# Patient Record
Sex: MALE | Race: WHITE | Employment: FULL TIME | URBAN - METROPOLITAN AREA
[De-identification: names, ages, dates, MRNs, and addresses within clinical notes are randomized per-mention and may not be internally consistent; named-entity substitution may affect disease eponyms.]

---

## 2019-10-18 ENCOUNTER — TELEPHONE (OUTPATIENT)
Dept: ENDOCRINOLOGY | Facility: CLINIC | Age: 24
End: 2019-10-18

## 2019-10-18 NOTE — TELEPHONE ENCOUNTER
Patient diagnosed in January 2019 and has been managed through [facility] (Records available through [facility] Everywhere).    OK to schedule next available.    Lupe Cruz LPN  Diabetes Clinic Coordinator   Adult Endocrinology and Pediatric Specialty Clinics  [facility]

## 2019-10-18 NOTE — TELEPHONE ENCOUNTER
[facility] Call Center    Phone Message    May a detailed message be left on voicemail: no    Reason for Call: Other: Pt is recently diagnosed diabetes through [facility]. He will have records faxed to [facility].  Per protocols please call pt for soonest appt.      Action Taken: Message routed to:  Adult Clinics: Endocrinology p [phone]

## 2019-10-18 NOTE — TELEPHONE ENCOUNTER
Attempted to call the patient to schedule. No answer, left voicemail for the patient to call back and schedule at the next available opening.     Jane Martin [facility]  Adult Endocrinology  [facility]

## 2019-10-21 NOTE — TELEPHONE ENCOUNTER
Contacted pt and he has already scheduled for December with Dr Montemayor.  Alisson Jeffery, CMA

## 2019-12-09 ENCOUNTER — OFFICE VISIT (OUTPATIENT)
Dept: ENDOCRINOLOGY | Facility: CLINIC | Age: 24
End: 2019-12-09
Payer: COMMERCIAL

## 2019-12-09 VITALS — WEIGHT: 241.4 LBS | DIASTOLIC BLOOD PRESSURE: 91 MMHG | HEART RATE: 101 BPM | SYSTOLIC BLOOD PRESSURE: 127 MMHG

## 2019-12-09 DIAGNOSIS — E10.9 TYPE 1 DIABETES MELLITUS WITHOUT COMPLICATION (H): Primary | ICD-10-CM

## 2019-12-09 DIAGNOSIS — E78.5 HYPERLIPIDEMIA LDL GOAL <100: ICD-10-CM

## 2019-12-09 PROCEDURE — 99204 OFFICE O/P NEW MOD 45 MIN: CPT | Performed by: INTERNAL MEDICINE

## 2019-12-09 RX ORDER — INSULIN LISPRO 100 [IU]/ML
15 INJECTION, SOLUTION INTRAVENOUS; SUBCUTANEOUS
COMMUNITY
Start: 2019-10-23 | End: 2020-04-28

## 2019-12-09 RX ORDER — MULTIVITAMIN,THER AND MINERALS
1 TABLET ORAL DAILY
COMMUNITY

## 2019-12-09 RX ORDER — FLASH GLUCOSE SCANNING READER
EACH MISCELLANEOUS
COMMUNITY
Start: 2019-02-22 | End: 2021-04-10

## 2019-12-09 RX ORDER — CETIRIZINE HYDROCHLORIDE 10 MG/1
10 TABLET ORAL DAILY PRN
COMMUNITY

## 2019-12-09 RX ORDER — INSULIN GLARGINE 100 [IU]/ML
30 INJECTION, SOLUTION SUBCUTANEOUS DAILY
Refills: 5 | COMMUNITY
Start: 2019-10-18 | End: 2020-04-28

## 2019-12-09 RX ORDER — ATORVASTATIN CALCIUM 20 MG/1
20 TABLET, FILM COATED ORAL DAILY
COMMUNITY
Start: 2019-10-23 | End: 2020-03-10

## 2019-12-09 NOTE — PROGRESS NOTES
Endocrinology Clinic Visit    Chief Complaint: New Patient (Diabetes)     Information obtained from:Patient    Subjective:         HPI: Owen oSlomon is a 24 year old male with history of type 1 diabetes who is seen in consultation at Liberty King's request for the same.    He was diagnosed with type 1 diabetes after presenting with DKA in December 2018.  At that time he has had also 40-50 pounds weight loss.  He was initiated on insulin therapy since then.        Presenting A1c was 12.7 as documented above.  Later on A1c improved to 7.4 in October 2019.  He has a brother with type 1 diabetes as well.  He has gained back all the weight loss he has had prior to his diagnosis.    Current antidiabetic therapies include   Basaglar insulin 30 units daily  Carbohydrate coverage 1 unit for every 7 g of carbohydrate with meals  Correction scale-random did not have any correction scale provided to him.    We downloaded his glo continuous glucose monitor today.  Reviewed blood sugar readings between November 26, 2019-December 9, 2019    Average blood sugar was 200.  He was within the target range of  about 42% of the time  He has had blood sugars below 70 about 2% of the time    December 1-257, 278  December 2 211, 58, 59, 180, 302, 221  December 3-222, 141, 170, 113, 50, 74  December 4-310, 127, 93, 64, 117, 142, 154, 227, 40, 57  December 5-319, 146, 198  December 6-248, 136, 201, 206    The lowest blood sugar noted was 50.  Blood sugars usually happen following correction scale.  Hypoglycemia symptoms when his blood sugars in the 70s are dizziness, sweatiness, and feeling tired.    Currently he does not miss his insulin therapy other and may be missing breakfast dose with blood.  He eats 3 meals per day.  He works in [employer] field and his schedule varies from day-to-day.  Typically he wakes up between 4:30 AM-8:30 AM in the morning.  Lunch is afternoon.  Dinner is between 5-6 PM in the evening.  He does not  snack after dinner most of the time.  He has gained back of his weight he has lost prior to his diagnosis of type 1 diabetes.    Chronic complications of diabetes  Eye examination up-to-date-no diabetic retinopathy  No microalbuminuria  Blood pressure showed slightly increased diastolic blood pressure at 121/91  No peripheral neuropathy  Non-smoker  Is currently on Lipitor which was started by his previous endocrinologist for LDL of 186 in January 2019.  Does not report any side effects from this medication.  He has a grandfather with a history of heart disease.     No Known Allergies    Current Outpatient Medications   Medication Sig Dispense Refill     atorvastatin (LIPITOR) 20 MG tablet Take 20 mg by mouth daily       cetirizine (ZYRTEC) 10 MG tablet Take 10 mg by mouth daily       Continuous Blood Gluc  (FREESTYLE PAUL 14 DAY READER) ADELAIDE 5 times daily       insulin lispro (HUMALOG KWIKPEN) 100 UNIT/ML (1 unit dial) pen Inject 15 Units Subcutaneous       LANTUS SOLOSTAR 100 UNIT/ML soln Inject 30 Units Subcutaneous daily  5     Multiple Vitamins-Minerals (SUPER THERA CHANO M) TABS Take 1 tablet by mouth daily         Review of Systems     11 point review system (Constitutional, HENT, Eyes, Respiratory, Cardiovascular, Gastrointestinal, Genitourinary, Musculoskeletal,Neurological, Psychiatric/Behavioural, Endocrine) is negative or is as per HPI above    Past Medical History:   Diagnosis Date     Type 1 diabetes (H)        History reviewed. No pertinent surgical history.    Family History   Problem Relation Age of Onset     Thyroid Disease Mother      Diabetes Type 1 Brother      Heart Disease Maternal Grandfather        Social History     Socioeconomic History     Marital status: Single     Spouse name: None     Number of children: None     Years of education: None     Highest education level: None   Occupational History     None   Social Needs     Financial resource strain: None     Food insecurity:      Worry: None     Inability: None     Transportation needs:     Medical: None     Non-medical: None   Tobacco Use     Smoking status: Current Some Day Smoker     Types: Cigars     Smokeless tobacco: Never Used   Substance and Sexual Activity     Alcohol use: None     Drug use: None     Sexual activity: None   Lifestyle     Physical activity:     Days per week: None     Minutes per session: None     Stress: None   Relationships     Social connections:     Talks on phone: None     Gets together: None     Attends Nondenominational service: None     Active member of club or organization: None     Attends meetings of clubs or organizations: None     Relationship status: None     Intimate partner violence:     Fear of current or ex partner: None     Emotionally abused: None     Physically abused: None     Forced sexual activity: None   Other Topics Concern     None   Social History Narrative     None       Objective:   BP (!) 127/91   Pulse 101   Wt 109.5 kg (241 lb 6.5 oz)   Constitutional: Pleasant no acute cardiopulmonary distress.   EYES: anicteric, normal extra-ocular movements, no lid lag or retraction, is equal and reactive to light bilaterally.  HEENT: Mouth/Throat: Mucous membrane is moist. Oropharynx is clear.  Thyroid examination:   Cardiovascular: RRR, S1, S2 normal.   Pulmonary/Chest: CTAB. No wheezing or rales.   Abdominal: +BS. Non tender to palpation.  Stretch marks:   Neurological: Alert and oriented.  No tremor and reflexes are symmetrical bilaterally and within the normal limits. Muscle strength 5/5.   Extremities: No edema.  Skin: normal texture, color.  Lymphatic: no cervical lymphadenopathy.  Psychological: appropriate mood and affect     In House Labs:                    Assessment/Treatment Plan:      Type 1 diabetes recently diagnosed December 2018 after presenting with DKA and 50 pound weight loss:    Current antidiabetic therapies include   Basaglar insulin 30 units daily  Carbohydrate coverage 1 unit  for every 7 g of carbohydrate with meals  Correction scale-random     Based on a fasting blood sugar which is consistently high and low blood sugars noted following correction scale following adjustment was made on his insulin regimen.  Increase Basaglar insulin to 35 units daily from current dose of 30 units daily.  We will continue the same dose of carbohydrate coverage 1 unit for every 7 g of carbohydrates.  Will use for now correctional scale 1 unit for every 35 mg/dL above 40.  The following written instructions provided to the patient.  I have advised him to contact us if blood sugars persistently below 70.  Otherwise will follow-up with us in 4 weeks with CDE.  Referral arranged.    Chronic complications of diabetes  Eye examination up-to-date-no diabetic retinopathy  No microalbuminuria  Blood pressure showed slightly increased diastolic blood pressure at 121/91  No peripheral neuropathy  Non-smoker  Is currently on Lipitor which was started by his previous endocrinologist for LDL of 186 in January 2019.  Does not report any side effects from this medication.  He has a grandfather with a history of heart disease.  We are ordering blood work for thyroid, C-peptide and whitney antibody today.  Prior blood work which were done at an outside facility including basic metabolic panel, LFTs, and cholesterol panels were reviewed in detail.    Patient Instructions   Increase Lantus dose to 35 units daily.     Continue with mealtime insulin (HUMOLOG)  of 1 unit for every 7 grams of carbohydrates with meals and snacks.     Please follow this correction scale with meals three times per day and at bedtime USING HUMOLOG insulin.   For  to 174 give 1 units.  For  to 209 give 2 units.  For  to 244 give 3 units.  For  to 279 give 4 units.  For  to 314 give 5 units.  For  to 359 give 6 units.  For BG >/= to 360 give 7 units.    [facility]-Department of  Endocrinology  Clarissa Francis RN, Diabetes Educator: [phone]  Lupe Cruz LPN Diabetes Clinic Coordinator: [phone]  Clinic Line:[phone]  Clinic Fax: [phone]  On-Call Endocrine Provider at [facility] (after hours/weekends): [phone] option 4  Scheduling Line: [phone]    Appointment Reminders:  * Please bring meter and/or CGM device with for staff to download  * If you are due ONLY for an A1C, it is scheduled with the nurse and will be done in clinic. You do not need to schedule a lab appointment. Fasting is not required for an A1C.  * Refill request should be submitted to your pharmacy. They will contact clinic for approval.            I will contact the patient with the test results.  Return to clinic in 1 month with CDE..    Test and/or medications prescribed today:  Orders Placed This Encounter   Procedures     C-peptide     Glutamic acid decarboxylase (MARIBELL) antibody     TSH with free T4 reflex     AMBULATORY ADULT DIABETES EDUCATOR REFERRAL         Dipak Montemayor MD  Staff Endocrinologist    [phone]  Division of Endocrinology and Diabetes

## 2019-12-09 NOTE — LETTER
12/9/2019         RE: Owen Solomon  [id number] [address] [place] [id number]        Dear Colleague,    Thank you for referring your patient, Owen Solomon, to the [facility]. Please see a copy of my visit note below.    Endocrinology Clinic Visit    Chief Complaint: New Patient (Diabetes)     Information obtained from:Patient    Subjective:         HPI: Owen Solomon is a 24 year old male with history of type 1 diabetes who is seen in consultation at April  Fernando's request for the same.    He was diagnosed with type 1 diabetes after presenting with DKA in December 2018.  At that time he has had also 40-50 pounds weight loss.  He was initiated on insulin therapy since then.        Presenting A1c was 12.7 as documented above.  Later on A1c improved to 7.4 in October 2019.  He has a brother with type 1 diabetes as well.  He has gained back all the weight loss he has had prior to his diagnosis.    Current antidiabetic therapies include   Basaglar insulin 30 units daily  Carbohydrate coverage 1 unit for every 7 g of carbohydrate with meals  Correction scale-[facility] did not have any correction scale provided to him.    We downloaded his glo continuous glucose monitor today.  Reviewed blood sugar readings between November 26, 2019-December 9, 2019    Average blood sugar was 200.  He was within the target range of  about 42% of the time  He has had blood sugars below 70 about 2% of the time    December 1-257, 278  December 2 211, 58, 59, 180, 302, 221  December 3-222, 141, 170, 113, 50, 74  December 4-310, 127, 93, 64, 117, 142, 154, 227, 40, 57  December 5-319, 146, 198  December 6-248, 136, 201, 206    The lowest blood sugar noted was 50.  Blood sugars usually happen following correction scale.  Hypoglycemia symptoms when his blood sugars in the 70s are dizziness, sweatiness, and feeling tired.    Currently he does not miss his insulin therapy other and may be missing  breakfast dose with blood.  He eats 3 meals per day.  He works in [employer] field and his schedule varies from day-to-day.  Typically he wakes up between 4:30 AM-8:30 AM in the morning.  Lunch is afternoon.  Dinner is between 5-6 PM in the evening.  He does not snack after dinner most of the time.  He has gained back of his weight he has lost prior to his diagnosis of type 1 diabetes.    Chronic complications of diabetes  Eye examination up-to-date-no diabetic retinopathy  No microalbuminuria  Blood pressure showed slightly increased diastolic blood pressure at 121/91  No peripheral neuropathy  Non-smoker  Is currently on Lipitor which was started by his previous endocrinologist for LDL of 186 in January 2019.  Does not report any side effects from this medication.  He has a grandfather with a history of heart disease.     No Known Allergies    Current Outpatient Medications   Medication Sig Dispense Refill     atorvastatin (LIPITOR) 20 MG tablet Take 20 mg by mouth daily       cetirizine (ZYRTEC) 10 MG tablet Take 10 mg by mouth daily       Continuous Blood Gluc  (AsicAheadYLE PAUL 14 DAY READER) ADELAIDE 5 times daily       insulin lispro (HUMALOG KWIKPEN) 100 UNIT/ML (1 unit dial) pen Inject 15 Units Subcutaneous       LANTUS SOLOSTAR 100 UNIT/ML soln Inject 30 Units Subcutaneous daily  5     Multiple Vitamins-Minerals (SUPER THERA CHANO M) TABS Take 1 tablet by mouth daily         Review of Systems     11 point review system (Constitutional, HENT, Eyes, Respiratory, Cardiovascular, Gastrointestinal, Genitourinary, Musculoskeletal,Neurological, Psychiatric/Behavioural, Endocrine) is negative or is as per HPI above    Past Medical History:   Diagnosis Date     Type 1 diabetes (H)        History reviewed. No pertinent surgical history.    Family History   Problem Relation Age of Onset     Thyroid Disease Mother      Diabetes Type 1 Brother      Heart Disease Maternal Grandfather        Social History     Socioeconomic  History     Marital status: Single     Spouse name: None     Number of children: None     Years of education: None     Highest education level: None   Occupational History     None   Social Needs     Financial resource strain: None     Food insecurity:     Worry: None     Inability: None     Transportation needs:     Medical: None     Non-medical: None   Tobacco Use     Smoking status: Current Some Day Smoker     Types: Cigars     Smokeless tobacco: Never Used   Substance and Sexual Activity     Alcohol use: None     Drug use: None     Sexual activity: None   Lifestyle     Physical activity:     Days per week: None     Minutes per session: None     Stress: None   Relationships     Social connections:     Talks on phone: None     Gets together: None     Attends Congregation service: None     Active member of club or organization: None     Attends meetings of clubs or organizations: None     Relationship status: None     Intimate partner violence:     Fear of current or ex partner: None     Emotionally abused: None     Physically abused: None     Forced sexual activity: None   Other Topics Concern     None   Social History Narrative     None       Objective:   BP (!) 127/91   Pulse 101   Wt 109.5 kg (241 lb 6.5 oz)   Constitutional: Pleasant no acute cardiopulmonary distress.   EYES: anicteric, normal extra-ocular movements, no lid lag or retraction, is equal and reactive to light bilaterally.  HEENT: Mouth/Throat: Mucous membrane is moist. Oropharynx is clear.  Thyroid examination:   Cardiovascular: RRR, S1, S2 normal.   Pulmonary/Chest: CTAB. No wheezing or rales.   Abdominal: +BS. Non tender to palpation.  Stretch marks:   Neurological: Alert and oriented.  No tremor and reflexes are symmetrical bilaterally and within the normal limits. Muscle strength 5/5.   Extremities: No edema.  Skin: normal texture, color.  Lymphatic: no cervical lymphadenopathy.  Psychological: appropriate mood and affect     In House Labs:                     Assessment/Treatment Plan:      Type 1 diabetes recently diagnosed December 2018 after presenting with DKA and 50 pound weight loss:    Current antidiabetic therapies include   Basaglar insulin 30 units daily  Carbohydrate coverage 1 unit for every 7 g of carbohydrate with meals  Correction scale-random     Based on a fasting blood sugar which is consistently high and low blood sugars noted following correction scale following adjustment was made on his insulin regimen.  Increase Basaglar insulin to 35 units daily from current dose of 30 units daily.  We will continue the same dose of carbohydrate coverage 1 unit for every 7 g of carbohydrates.  Will use for now correctional scale 1 unit for every 35 mg/dL above 40.  The following written instructions provided to the patient.  I have advised him to contact us if blood sugars persistently below 70.  Otherwise will follow-up with us in 4 weeks with CDE.  Referral arranged.    Chronic complications of diabetes  Eye examination up-to-date-no diabetic retinopathy  No microalbuminuria  Blood pressure showed slightly increased diastolic blood pressure at 121/91  No peripheral neuropathy  Non-smoker  Is currently on Lipitor which was started by his previous endocrinologist for LDL of 186 in January 2019.  Does not report any side effects from this medication.  He has a grandfather with a history of heart disease.  We are ordering blood work for thyroid, C-peptide and whitney antibody today.  Prior blood work which were done at an outside facility including basic metabolic panel, LFTs, and cholesterol panels were reviewed in detail.    Patient Instructions   Increase Lantus dose to 35 units daily.     Continue with mealtime insulin (HUMOLOG)  of 1 unit for every 7 grams of carbohydrates with meals and snacks.     Please follow this correction scale with meals three times per day and at bedtime USING HUMOLOG insulin.   For  to 174 give 1 units.  For BG  175 to 209 give 2 units.  For  to 244 give 3 units.  For  to 279 give 4 units.  For  to 314 give 5 units.  For  to 359 give 6 units.  For BG >/= to 360 give 7 units.    [facility]  Clarissa Francis RN, Diabetes Educator: [phone]  Lupe Cruz LPN Diabetes Clinic Coordinator: [phone]  Clinic Line:[phone]  Clinic Fax: [phone]  On-Call Endocrine Provider at [facility] (after hours/weekends): [phone] option 4  Scheduling Line: [phone]    Appointment Reminders:  * Please bring meter and/or CGM device with for staff to download  * If you are due ONLY for an A1C, it is scheduled with the nurse and will be done in clinic. You do not need to schedule a lab appointment. Fasting is not required for an A1C.  * Refill request should be submitted to your pharmacy. They will contact clinic for approval.            I will contact the patient with the test results.  Return to clinic in 1 month with CDE..    Test and/or medications prescribed today:  Orders Placed This Encounter   Procedures     C-peptide     Glutamic acid decarboxylase (MARIBELL) antibody     TSH with free T4 reflex     AMBULATORY ADULT DIABETES EDUCATOR REFERRAL         Dipak Montemayor MD  Staff Endocrinologist    [phone]  Division of Endocrinology and Diabetes            Again, thank you for allowing me to participate in the care of your patient.        Sincerely,        Dipak Montemayor MD

## 2019-12-09 NOTE — PATIENT INSTRUCTIONS
Increase Lantus dose to 35 units daily.     Continue with mealtime insulin (HUMOLOG)  of 1 unit for every 7 grams of carbohydrates with meals and snacks.     Please follow this correction scale with meals three times per day and at bedtime USING HUMOLOG insulin.   For  to 174 give 1 units.  For  to 209 give 2 units.  For  to 244 give 3 units.  For  to 279 give 4 units.  For  to 314 give 5 units.  For  to 359 give 6 units.  For BG >/= to 360 give 7 units.    [facility]  Clarissa Francis RN, Diabetes Educator: [phone]  Lupe Cruz LPN Diabetes Clinic Coordinator: [phone]  Clinic Line:[phone]  Clinic Fax: [phone]  On-Call Endocrine Provider at the [place] (after hours/weekends): [phone] option 4  Scheduling Line: [phone]    Appointment Reminders:  * Please bring meter and/or CGM device with for staff to download  * If you are due ONLY for an A1C, it is scheduled with the nurse and will be done in clinic. You do not need to schedule a lab appointment. Fasting is not required for an A1C.  * Refill request should be submitted to your pharmacy. They will contact clinic for approval.

## 2019-12-09 NOTE — NURSING NOTE
Owen Solomon's goals for this visit include:   Chief Complaint   Patient presents with     New Patient     Diabetes     He requests these members of his care team be copied on today's visit information:     PCP: Liberty King    Referring Provider:  Liberty King  [facility]  [address] [place] [address]    BP (!) 151/112 (BP Location: Left arm, Patient Position: Sitting, Cuff Size: Adult Regular)   Pulse 101   Wt 109.5 kg (241 lb 6.5 oz)     Do you need any medication refills at today's visit? No

## 2020-01-24 ENCOUNTER — ALLIED HEALTH/NURSE VISIT (OUTPATIENT)
Dept: EDUCATION SERVICES | Facility: CLINIC | Age: 25
End: 2020-01-24
Payer: COMMERCIAL

## 2020-01-24 DIAGNOSIS — E10.9 TYPE 1 DIABETES MELLITUS (H): Primary | ICD-10-CM

## 2020-01-24 DIAGNOSIS — E11.9 DIABETES MELLITUS WITHOUT COMPLICATION (H): Primary | ICD-10-CM

## 2020-01-24 PROCEDURE — 99207 ZZC DROP WITH A PROCEDURE: CPT

## 2020-01-24 PROCEDURE — G0108 DIAB MANAGE TRN  PER INDIV: HCPCS

## 2020-01-24 RX ORDER — URINE ACETONE TEST STRIPS
STRIP MISCELLANEOUS PRN
Qty: 25 EACH | Refills: 0 | Status: SHIPPED | OUTPATIENT
Start: 2020-01-24

## 2020-01-24 NOTE — PROGRESS NOTES
Diabetes Self Management Training: Individual Review Visit    Owen Solomon presents today for education and evaluation of glucose control related to Type 1 diabetes.    He is accompanied by self    Patient's diabetes management related comments/concerns: None at this time.     Patient's emotional response to diabetes: expresses readiness to learn and acceptance.     Patient would like this visit to be focused around the following diabetes-related behaviors and goals: N/a.    ASSESSMENT:  Patient presents to clinic to meet with Cde for bg review. Diagnosed with Type 1 Diabetes, Dec, 2018. Diabetes therapy: basal/bolus plus Carmen Cgms system. Pt met with Endo Dec, 2019. Works full time in IT. Lives with sister. Independantly cares for his diabetes.       Current Diabetes Management per Patient:  Taking diabetes medications? Yes: see below.        Diabetes Medication(s)     Diabetic Other       Glucagon (BAQSIMI ONE PACK) 3 MG/DOSE POWD    Spray 1 each in nostril as needed    Insulin       insulin lispro (HUMALOG KWIKPEN) 100 UNIT/ML (1 unit dial) pen    Inject 15 Units Subcutaneous     LANTUS SOLOSTAR 100 UNIT/ML soln    Inject 30 Units Subcutaneous daily          Do you have any difficulty affording your medications or glucose monitoring supplies?  No.     Patient glucose self monitoring as follows:Uses [facility] Cgms. Had issue with one sensor going bad so was without sensor for > 2weeks. Pt states he checked bg using meter during this time but forgot to bring meter to appt. Cde had pt call [facility] during visit to ask them to replace bad sensor. Task completed.     BG RESULTS: Per Carmen Download - only had four days of bg values recorded.   Based on download report, insulin dose changes were not made.     Nutrition:  Not discussed today.    Physical Activity:    Not discussed today.    Diabetes Risk Factors:  Family history - brother, age 30, has Type 1. Diagnosed, age 11.      Relevant co-morbidities and related  "health problems:  Significant for: None    Diabetes Complications:  Not discussed today.    Recent health service and resource utilization related to diabetes (hyperglycemia, hypoglycemia, etc):   None    Vitals:  There were no vitals taken for this visit.  There is no height or weight on file to calculate BMI.   Last 3 BP:   BP Readings from Last 3 Encounters:   12/09/19 (!) 127/91       History   Smoking Status     Never Smoker   Smokeless Tobacco     Never Used       Socio/Economic/Cultural Considerations:    Support system: not assessed    Cultural Influences/Ethnic Background:  American    Health Literacy/Numeracy:  \"With diabetes, it's helpful to use forms and log books to write down blood sugars and what you're eating at times to help understand how foods affect your blood sugars. With this in mind how confident are you at filling out medical forms, such as these, by yourself?  Not Assessed    Health Beliefs and Attitudes:   Patient Activation Measure Survey Score:  No flowsheet data found.    Stage of Change: ACTION (Actively working towards change)      Diabetes knowledge and skills assessment:     Patient is knowledgeable in diabetes management concepts related to: Monitoring and Taking Medication    Patient needs further education on the following diabetes management concepts: Healthy Eating, Being Active, Problem Solving and Reducing Risks    Barriers to Learning Assessment: No Barriers identified    Based on learning assessment above, most appropriate setting for further diabetes education would be: Individual setting.    INTERVENTION:   Education provided today on:  Diabetes Pathophysiology: discussed difference between Type 1 and type 2 Diabetes, causes and treatment.   Taking Medication: discussed difference in action between basal and bolus insulin.   Problem Solving: discussed sick day and ketone mgmt and bg pattern mgmt.    Opportunities for ongoing education and support in diabetes-self " "management were discussed.    Pt verbalized understanding of concepts discussed and recommendations provided today.       Education Materials Provided:  Pt encouraged to go online and purchase a used copy of the \"[employer] [place]\" book \"Understanding Type 1 Diabetes\".   Pt encouraged to download the Calorie Miguel Ángel and My Fitness Pal apps to help with carb counting.    PLAN:  Keep f/u appt with Endo.     Ongoing plan for education and support: As needed.     Time Spent: 75 minutes  Encounter Type: Individual    Any diabetes medication dose changes were made via the CDE Protocol and Collaborative Practice Agreement with the patient's endocrinology provider. A copy of this encounter was shared with the provider.    Owen Solomon comes into clinic today at the request of Dr Montemayor, Ordering Provider for Pt Teaching on diabetes self mgmt and bg review.     This service provided today was under the supervising provider of the ignacio Dickson, who was available if needed.    Clarissa Francis, RN, BSN, CDE   [facility]  "

## 2020-02-23 DIAGNOSIS — E10.9 TYPE 1 DIABETES MELLITUS (H): ICD-10-CM

## 2020-02-25 NOTE — TELEPHONE ENCOUNTER
Glucagon (BAQSIMI ONE PACK) 3 MG/DOSE POWD      Last Written Prescription Date:  1/24/20  Last Fill Quantity: 1 ea,   # refills: 0  Last Office Visit : 12/9/19  Future Office visit:  3/9/20    Refill to pharmacy.

## 2020-03-09 ENCOUNTER — OFFICE VISIT (OUTPATIENT)
Dept: ENDOCRINOLOGY | Facility: CLINIC | Age: 25
End: 2020-03-09
Payer: COMMERCIAL

## 2020-03-09 VITALS
SYSTOLIC BLOOD PRESSURE: 140 MMHG | HEIGHT: 73 IN | HEART RATE: 97 BPM | BODY MASS INDEX: 33.93 KG/M2 | OXYGEN SATURATION: 97 % | WEIGHT: 256 LBS | DIASTOLIC BLOOD PRESSURE: 96 MMHG

## 2020-03-09 DIAGNOSIS — E78.5 HYPERLIPIDEMIA LDL GOAL <70: ICD-10-CM

## 2020-03-09 DIAGNOSIS — E10.9 TYPE 1 DIABETES MELLITUS WITHOUT COMPLICATION (H): Primary | ICD-10-CM

## 2020-03-09 LAB
ANION GAP SERPL CALCULATED.3IONS-SCNC: 4 MMOL/L (ref 3–14)
BUN SERPL-MCNC: 18 MG/DL (ref 7–30)
C PEPTIDE SERPL-MCNC: 0.5 NG/ML (ref 0.9–6.9)
CALCIUM SERPL-MCNC: 9.4 MG/DL (ref 8.5–10.1)
CHLORIDE SERPL-SCNC: 105 MMOL/L (ref 94–109)
CHOLEST SERPL-MCNC: 246 MG/DL
CO2 SERPL-SCNC: 30 MMOL/L (ref 20–32)
CREAT SERPL-MCNC: 0.85 MG/DL (ref 0.66–1.25)
GFR SERPL CREATININE-BSD FRML MDRD: >90 ML/MIN/{1.73_M2}
GLUCOSE SERPL-MCNC: 158 MG/DL (ref 70–99)
HBA1C MFR BLD: 7.8 % (ref 0–5.6)
HDLC SERPL-MCNC: 65 MG/DL
LDLC SERPL CALC-MCNC: 157 MG/DL
NONHDLC SERPL-MCNC: 181 MG/DL
POTASSIUM SERPL-SCNC: 4.5 MMOL/L (ref 3.4–5.3)
SODIUM SERPL-SCNC: 139 MMOL/L (ref 133–144)
TRIGL SERPL-MCNC: 118 MG/DL
TSH SERPL DL<=0.005 MIU/L-ACNC: 3.36 MU/L (ref 0.4–4)
TSH SERPL DL<=0.005 MIU/L-ACNC: 3.36 MU/L (ref 0.4–4)

## 2020-03-09 PROCEDURE — 84443 ASSAY THYROID STIM HORMONE: CPT | Performed by: INTERNAL MEDICINE

## 2020-03-09 PROCEDURE — 95251 CONT GLUC MNTR ANALYSIS I&R: CPT | Performed by: INTERNAL MEDICINE

## 2020-03-09 PROCEDURE — 84681 ASSAY OF C-PEPTIDE: CPT | Performed by: INTERNAL MEDICINE

## 2020-03-09 PROCEDURE — 86341 ISLET CELL ANTIBODY: CPT | Mod: 90 | Performed by: INTERNAL MEDICINE

## 2020-03-09 PROCEDURE — 99214 OFFICE O/P EST MOD 30 MIN: CPT | Performed by: INTERNAL MEDICINE

## 2020-03-09 PROCEDURE — 99000 SPECIMEN HANDLING OFFICE-LAB: CPT | Performed by: INTERNAL MEDICINE

## 2020-03-09 PROCEDURE — 80048 BASIC METABOLIC PNL TOTAL CA: CPT | Performed by: INTERNAL MEDICINE

## 2020-03-09 PROCEDURE — 36415 COLL VENOUS BLD VENIPUNCTURE: CPT | Performed by: INTERNAL MEDICINE

## 2020-03-09 PROCEDURE — 80061 LIPID PANEL: CPT | Performed by: INTERNAL MEDICINE

## 2020-03-09 PROCEDURE — 83036 HEMOGLOBIN GLYCOSYLATED A1C: CPT | Performed by: INTERNAL MEDICINE

## 2020-03-09 ASSESSMENT — MIFFLIN-ST. JEOR: SCORE: 2196.21

## 2020-03-09 NOTE — PROGRESS NOTES
Endocrinology Clinic Visit    Chief Complaint: Diabetes (3 Mo F/U Diabetes-Carmen)     Information obtained from:Patient    Subjective:         HPI: Owen Solomon is a 24 year old male with history of type 1 diabetes who is here for routine follow up.   He was diagnosed with type 1 diabetes after presenting with DKA in December 2018.  At that time he has had also 40-50 pounds weight loss.  He was initiated on insulin therapy since then.    Hemoglobin A1C   Date Value Ref Range Status   03/09/2020 7.8 (A) 0 - 5.6 % Final       Presenting A1c was 12.7 as documented above.  Later on A1c improved to 7.4 in October 2019.  He has a brother with type 1 diabetes as well.  He has gained back all the weight loss he has had prior to his diagnosis.  His A1c today is 7.8    Current antidiabetic therapies include   Basaglar insulin 35 units daily  Carbohydrate coverage 1 unit for every 7 g of carbohydrate with meals  Correction scale-1 unit for every 35 above 140  We downloaded his carmen continuous glucose monitor today.  Reviewed blood sugar readings between February 11, 2020-March 9, 2020  Average blood sugar was 197.  He was within the target range of  about 41% of the time  He has had blood sugars below 70 about 2% of the time he has had blood sugar readings  Between 181-250 mg/dL about 38% of the time  He has had very high blood sugars above 250  19% of the time.    Overall, detailed analysis of the carmen view download indicated blood sugar has been high throughout.  He has had high fasting blood sugars ranging between between low 100s- mid 200s.  He did not have significant lows however he reports that most of the lows have been following dinner at about midnight or so.  Hypoglycemia symptoms when his blood sugars in the 70s are dizziness, sweatiness, and feeling tired.    Currently he does not miss his insulin therapy other and may be missing breakfast dose occasionally.  He eats 3 meals per day.  He works in [employer]  field and his schedule varies from day-to-day.  Typically he wakes up between 4:30 AM-8:30 AM in the morning.  Lunch is afternoon.  Dinner is between 5-6 PM in the evening.  He does not snack after dinner most of the time.  He has gained back of his weight he has lost prior to his diagnosis of type 1 diabetes.    Chronic complications of diabetes  Eye examination up-to-date-no diabetic retinopathy  No microalbuminuria last eye exam was December of thousand 19  Blood pressure showed slightly increased diastolic blood pressure at 136/91-patient would like to address this issue at his follow-up visits.  No peripheral neuropathy  Non-smoker  Is currently on Lipitor which was started by his previous endocrinologist for LDL of 186 in January 2019.  Does not report any side effects from this medication.  He has a grandfather with a history of heart disease.     No Known Allergies    Current Outpatient Medications   Medication Sig Dispense Refill     atorvastatin (LIPITOR) 20 MG tablet Take 20 mg by mouth daily       cetirizine (ZYRTEC) 10 MG tablet Take 10 mg by mouth daily       Continuous Blood Gluc  (Ludi labsYLE PAUL 14 DAY READER) ADELAIDE 5 times daily       Glucagon 3 MG/DOSE POWD Spray 1 each in nostril as needed 1 each 0     insulin lispro (HUMALOG KWIKPEN) 100 UNIT/ML (1 unit dial) pen Inject 15 Units Subcutaneous       KETOSTIX test strip by TABLE SOLN route as needed 25 each 0     LANTUS SOLOSTAR 100 UNIT/ML soln Inject 30 Units Subcutaneous daily  5     Multiple Vitamins-Minerals (SUPER THERA CHANO M) TABS Take 1 tablet by mouth daily         Review of Systems     8 point review system (Constitutional, HENT, Eyes, Respiratory, Cardiovascular, Gastrointestinal, Genitourinary, Musculoskeletal,Neurological, Psychiatric/Behavioural, Endocrine) is negative or is as per HPI above    Past Medical History:   Diagnosis Date     Type 1 diabetes (H)        History reviewed. No pertinent surgical history.    Family  "History   Problem Relation Age of Onset     Thyroid Disease Mother      Diabetes Type 1 Brother      Heart Disease Maternal Grandfather        Social History     Socioeconomic History     Marital status: Single     Spouse name: None     Number of children: None     Years of education: None     Highest education level: None   Occupational History     None   Social Needs     Financial resource strain: None     Food insecurity:     Worry: None     Inability: None     Transportation needs:     Medical: None     Non-medical: None   Tobacco Use     Smoking status: Current Some Day Smoker     Types: Cigars     Smokeless tobacco: Never Used   Substance and Sexual Activity     Alcohol use: None     Drug use: None     Sexual activity: None   Lifestyle     Physical activity:     Days per week: None     Minutes per session: None     Stress: None   Relationships     Social connections:     Talks on phone: None     Gets together: None     Attends Latter-day service: None     Active member of club or organization: None     Attends meetings of clubs or organizations: None     Relationship status: None     Intimate partner violence:     Fear of current or ex partner: None     Emotionally abused: None     Physically abused: None     Forced sexual activity: None   Other Topics Concern     None   Social History Narrative     None       Objective:   BP (!) 140/96 (BP Location: Right arm, Patient Position: Sitting, Cuff Size: Adult Large)   Pulse 97   Ht 1.856 m (6' 1.07\")   Wt 116.1 kg (256 lb)   SpO2 97%   BMI 33.71 kg/m    Constitutional: Pleasant no acute cardiopulmonary distress.   Neurological: Alert and oriented.    Psychological: appropriate mood and affect     In House Labs:                  Hemoglobin A1C   Date Value Ref Range Status   03/09/2020 7.8 (A) 0 - 5.6 % Final       Assessment/Treatment Plan:      Type 1 diabetes recently diagnosed December 2018 after presenting with DKA and 50 pound weight loss:    Detailed " review of glo indicated blood sugars high throughout.  Given his fasting blood sugar is high therefore we have adjusted his insulin dose as follows.  He has a few lows following dinnertime carbohydrate boluses which results in lows around midnight; and usually he overcorrects and then stop having highs in the morning therefore in order for results we have reduced his dinnertime carbohydrate coverage as follows as well.  Correction dose seems to be okay at this point in time we will continue the same.  I discussed adding GLP-1 agonist with the patient in order to address weight and also for added benefit of glycemic control however he is not interested to do that at this point in time additional information provided.  We will check C-peptide level in this patient and if there is any endogenous insulin production he will benefit again from medications like GLP-1 agonists.  Further plan based on results.  Basaglar insulin 35 units daily  Carbohydrate coverage 1 unit for every 7 g of carbohydrate with meals except with dinner 1 unit for every 8  Correction scale-random 1 unit for every 35 above 140    Chronic complications of diabetes    Eye examination up-to-date-no diabetic retinopathy  No microalbuminuria last eye exam was December of thousand 19  Blood pressure showed slightly increased diastolic blood pressure at 136/91-patient would like to address this issue at his follow-up visits.  No peripheral neuropathy  Non-smoker  Is currently on Lipitor which was started by his previous endocrinologist for LDL of 186 in January 2019.  Does not report any side effects from this medication.  He has a grandfather with a history of heart disease.  We are ordering blood work for thyroid, C-peptide and whitney antibody today.  Prior blood work which were done at an outside facility including basic metabolic panel, LFTs, and cholesterol panels were reviewed in detail.    Lab results from your visit today noted.  Electrolytes  within the normal limits.  Normal kidney function test.  Total cholesterol was 249 triglycerides 118 with LDL cholesterol 157 and non-HDL cholesterol 181.  C-peptide is low as expected.  TSH is within the normal limits.  Based on the cholesterol level which documented above would be reasonable to adjust the Lipitor dose to 40 mg daily from the current dose of 20 mg daily.  Patient will be informed of the results.     Hyperlipidemia:  Total cholesterol was 249 triglycerides 118 with LDL cholesterol 157 and non-HDL cholesterol 181.  C-peptide is low as expected.  TSH is within the normal limits.  Based on the cholesterol level which documented above would be reasonable to adjust the Lipitor dose to 40 mg daily from the current dose of 20 mg daily.  Patient will be informed of the results.   Atorvastatin was changed to 40 mg daily and medication sent to pharmacy.    Patient Instructions   Blood work today.     Increase Lantus dose to 40 units daily.     Continue with mealtime insulin (HUMOLOG)  of 1 unit for every 7 grams of carbohydrates with meals and snacks.; reduce dinner time carbohydrate coverage to 1 unit for every 8 grams of carbohydrates.     Please follow this correction scale with meals three times per day and at bedtime USING HUMOLOG insulin.   For  to 174 give 1 units.  For  to 209 give 2 units.  For  to 244 give 3 units.  For  to 279 give 4 units.  For  to 314 give 5 units.  For  to 359 give 6 units.  For BG >/= to 360 give 7 units.        [facility]  Clarissa Francis RN, Diabetes Educator: [phone]  Clinic Nurses HARRISON Michel; CMA's: Jane [phone]  SPENCER Andrade : [phone]  Clinic Fax: [phone]  On-Call Endocrine at the [facility] (after hours/weekends): [phone] option 4  Scheduling Line: [phone]    Appointment Reminders:  * Please bring meter with for staff to download  * If you are due ONLY for  an A1C, it is scheduled with the nurse and will be done in clinic. You do not need to schedule a lab appointment. Fasting is not required for an A1C.  * Refill request should be submitted to your pharmacy. They will contact clinic for approval.          I will contact the patient with the test results.  Return to clinic in 3 months.  Test and/or medications prescribed today:  Orders Placed This Encounter   Procedures     Hemoglobin A1c POCT     Basic metabolic panel     C-peptide     TSH     Lipid panel reflex to direct LDL Fasting         Dipak Montemayor MD  Staff Endocrinologist    [phone]  Division of Endocrinology and Diabetes

## 2020-03-09 NOTE — LETTER
3/9/2020         RE: Owen Solomon  [id number] [address] [place] [id number]        Dear Colleague,    Thank you for referring your patient, Owen Solomon, to the [facility]. Please see a copy of my visit note below.    Endocrinology Clinic Visit    Chief Complaint: Diabetes (3 Mo F/U Diabetes-[facility])     Information obtained from:Patient    Subjective:         HPI: Owen Solomon is a 24 year old male with history of type 1 diabetes who is here for routine follow up.   He was diagnosed with type 1 diabetes after presenting with DKA in December 2018.  At that time he has had also 40-50 pounds weight loss.  He was initiated on insulin therapy since then.    Hemoglobin A1C   Date Value Ref Range Status   03/09/2020 7.8 (A) 0 - 5.6 % Final       Presenting A1c was 12.7 as documented above.  Later on A1c improved to 7.4 in October 2019.  He has a brother with type 1 diabetes as well.  He has gained back all the weight loss he has had prior to his diagnosis.  His A1c today is 7.8    Current antidiabetic therapies include   Basaglar insulin 35 units daily  Carbohydrate coverage 1 unit for every 7 g of carbohydrate with meals  Correction scale-1 unit for every 35 above 140  We downloaded his carmen continuous glucose monitor today.  Reviewed blood sugar readings between February 11, 2020-March 9, 2020  Average blood sugar was 197.  He was within the target range of  about 41% of the time  He has had blood sugars below 70 about 2% of the time he has had blood sugar readings  Between 181-250 mg/dL about 38% of the time  He has had very high blood sugars above 250  19% of the time.    Overall, detailed analysis of the carmen view download indicated blood sugar has been high throughout.  He has had high fasting blood sugars ranging between between low 100s- mid 200s.  He did not have significant lows however he reports that most of the lows have been following dinner at about midnight or  so.  Hypoglycemia symptoms when his blood sugars in the 70s are dizziness, sweatiness, and feeling tired.    Currently he does not miss his insulin therapy other and may be missing breakfast dose occasionally.  He eats 3 meals per day.  He works in [employer] and his schedule varies from day-to-day.  Typically he wakes up between 4:30 AM-8:30 AM in the morning.  Lunch is afternoon.  Dinner is between 5-6 PM in the evening.  He does not snack after dinner most of the time.  He has gained back of his weight he has lost prior to his diagnosis of type 1 diabetes.    Chronic complications of diabetes  Eye examination up-to-date-no diabetic retinopathy  No microalbuminuria last eye exam was December of thousand 19  Blood pressure showed slightly increased diastolic blood pressure at 136/91-patient would like to address this issue at his follow-up visits.  No peripheral neuropathy  Non-smoker  Is currently on Lipitor which was started by his previous endocrinologist for LDL of 186 in January 2019.  Does not report any side effects from this medication.  He has a grandfather with a history of heart disease.     No Known Allergies    Current Outpatient Medications   Medication Sig Dispense Refill     atorvastatin (LIPITOR) 20 MG tablet Take 20 mg by mouth daily       cetirizine (ZYRTEC) 10 MG tablet Take 10 mg by mouth daily       Continuous Blood Gluc  (FREESTYLE PAUL 14 DAY READER) ADELAIDE 5 times daily       Glucagon 3 MG/DOSE POWD Spray 1 each in nostril as needed 1 each 0     insulin lispro (HUMALOG KWIKPEN) 100 UNIT/ML (1 unit dial) pen Inject 15 Units Subcutaneous       KETOSTIX test strip by TABLE SOLN route as needed 25 each 0     LANTUS SOLOSTAR 100 UNIT/ML soln Inject 30 Units Subcutaneous daily  5     Multiple Vitamins-Minerals (SUPER THERA CAHNO M) TABS Take 1 tablet by mouth daily         Review of Systems     8 point review system (Constitutional, HENT, Eyes, Respiratory, Cardiovascular, Gastrointestinal,  "Genitourinary, Musculoskeletal,Neurological, Psychiatric/Behavioural, Endocrine) is negative or is as per HPI above    Past Medical History:   Diagnosis Date     Type 1 diabetes (H)        History reviewed. No pertinent surgical history.    Family History   Problem Relation Age of Onset     Thyroid Disease Mother      Diabetes Type 1 Brother      Heart Disease Maternal Grandfather        Social History     Socioeconomic History     Marital status: Single     Spouse name: None     Number of children: None     Years of education: None     Highest education level: None   Occupational History     None   Social Needs     Financial resource strain: None     Food insecurity:     Worry: None     Inability: None     Transportation needs:     Medical: None     Non-medical: None   Tobacco Use     Smoking status: Current Some Day Smoker     Types: Cigars     Smokeless tobacco: Never Used   Substance and Sexual Activity     Alcohol use: None     Drug use: None     Sexual activity: None   Lifestyle     Physical activity:     Days per week: None     Minutes per session: None     Stress: None   Relationships     Social connections:     Talks on phone: None     Gets together: None     Attends Gnosticist service: None     Active member of club or organization: None     Attends meetings of clubs or organizations: None     Relationship status: None     Intimate partner violence:     Fear of current or ex partner: None     Emotionally abused: None     Physically abused: None     Forced sexual activity: None   Other Topics Concern     None   Social History Narrative     None       Objective:   BP (!) 140/96 (BP Location: Right arm, Patient Position: Sitting, Cuff Size: Adult Large)   Pulse 97   Ht 1.856 m (6' 1.07\")   Wt 116.1 kg (256 lb)   SpO2 97%   BMI 33.71 kg/m    Constitutional: Pleasant no acute cardiopulmonary distress.   Neurological: Alert and oriented.    Psychological: appropriate mood and affect     In House Labs:        "           Hemoglobin A1C   Date Value Ref Range Status   03/09/2020 7.8 (A) 0 - 5.6 % Final       Assessment/Treatment Plan:      Type 1 diabetes recently diagnosed December 2018 after presenting with DKA and 50 pound weight loss:    Detailed review of glo indicated blood sugars high throughout.  Given his fasting blood sugar is high therefore we have adjusted his insulin dose as follows.  He has a few lows following dinnertime carbohydrate boluses which results in lows around midnight; and usually he overcorrects and then stop having highs in the morning therefore in order for results we have reduced his dinnertime carbohydrate coverage as follows as well.  Correction dose seems to be okay at this point in time we will continue the same.  I discussed adding GLP-1 agonist with the patient in order to address weight and also for added benefit of glycemic control however he is not interested to do that at this point in time additional information provided.  We will check C-peptide level in this patient and if there is any endogenous insulin production he will benefit again from medications like GLP-1 agonists.  Further plan based on results.  Basaglar insulin 35 units daily  Carbohydrate coverage 1 unit for every 7 g of carbohydrate with meals except with dinner 1 unit for every 8  Correction scale-random 1 unit for every 35 above 140    Chronic complications of diabetes    Eye examination up-to-date-no diabetic retinopathy  No microalbuminuria last eye exam was December of thousand 19  Blood pressure showed slightly increased diastolic blood pressure at 136/91-patient would like to address this issue at his follow-up visits.  No peripheral neuropathy  Non-smoker  Is currently on Lipitor which was started by his previous endocrinologist for LDL of 186 in January 2019.  Does not report any side effects from this medication.  He has a grandfather with a history of heart disease.  We are ordering blood work for  thyroid, C-peptide and whitney antibody today.  Prior blood work which were done at an outside facility including basic metabolic panel, LFTs, and cholesterol panels were reviewed in detail.    Lab results from your visit today noted.  Electrolytes within the normal limits.  Normal kidney function test.  Total cholesterol was 249 triglycerides 118 with LDL cholesterol 157 and non-HDL cholesterol 181.  C-peptide is low as expected.  TSH is within the normal limits.  Based on the cholesterol level which documented above would be reasonable to adjust the Lipitor dose to 40 mg daily from the current dose of 20 mg daily.  Patient will be informed of the results.     Hyperlipidemia:  Total cholesterol was 249 triglycerides 118 with LDL cholesterol 157 and non-HDL cholesterol 181.  C-peptide is low as expected.  TSH is within the normal limits.  Based on the cholesterol level which documented above would be reasonable to adjust the Lipitor dose to 40 mg daily from the current dose of 20 mg daily.  Patient will be informed of the results.   Atorvastatin was changed to 40 mg daily and medication sent to pharmacy.    Patient Instructions   Blood work today.     Increase Lantus dose to 40 units daily.     Continue with mealtime insulin (HUMOLOG)  of 1 unit for every 7 grams of carbohydrates with meals and snacks.; reduce dinner time carbohydrate coverage to 1 unit for every 8 grams of carbohydrates.     Please follow this correction scale with meals three times per day and at bedtime USING HUMOLOG insulin.   For  to 174 give 1 units.  For  to 209 give 2 units.  For  to 244 give 3 units.  For  to 279 give 4 units.  For  to 314 give 5 units.  For  to 359 give 6 units.  For BG >/= to 360 give 7 units.        [facility]-Department of Endocrinology  Clarissa Francis RN, Diabetes Educator: [phone]  Clinic Nurses HARRISON Michel; CMA's: Jane [phone]  SPENCER Andrade :  [phone]  Clinic Fax: [phone]  On-Call Endocrine at the [place] (after hours/weekends): [phone] option 4  Scheduling Line: [phone]    Appointment Reminders:  * Please bring meter with for staff to download  * If you are due ONLY for an A1C, it is scheduled with the nurse and will be done in clinic. You do not need to schedule a lab appointment. Fasting is not required for an A1C.  * Refill request should be submitted to your pharmacy. They will contact clinic for approval.          I will contact the patient with the test results.  Return to clinic in 3 months.  Test and/or medications prescribed today:  Orders Placed This Encounter   Procedures     Hemoglobin A1c POCT     Basic metabolic panel     C-peptide     TSH     Lipid panel reflex to direct LDL Fasting         Dipak Montemayor MD  Staff Endocrinologist    [phone]  Division of Endocrinology and Diabetes          Again, thank you for allowing me to participate in the care of your patient.        Sincerely,        Dipak Montemayor MD

## 2020-03-10 LAB — GAD65 AB SER IA-ACNC: >250 IU/ML (ref 0–5)

## 2020-03-10 RX ORDER — ATORVASTATIN CALCIUM 40 MG/1
40 TABLET, FILM COATED ORAL DAILY
Qty: 90 TABLET | Refills: 1 | Status: SHIPPED | OUTPATIENT
Start: 2020-03-10 | End: 2020-09-28

## 2020-03-10 NOTE — RESULT ENCOUNTER NOTE
Owen,    Attached please find blood work results done at the time of your visit at endocrinology clinic.  1.  The C-peptide level is low as expected in type 1 diabetes.  2.  Thyroid level is within normal limits  3.  Kidney function test and electrolytes are within the normal limits  4.  Your cholesterol test results indicated that the bad cholesterol LDL is a still on the high side- higher than desired therefore I recommend increasing the atorvastatin dose from current 20 mg daily to 40 mg daily.  I have sent a prescription to your pharmacy.      Please let me know if you have any questions regarding these results.    Dipak Montemayor MD

## 2020-03-11 ENCOUNTER — HEALTH MAINTENANCE LETTER (OUTPATIENT)
Age: 25
End: 2020-03-11

## 2020-03-20 ENCOUNTER — TELEPHONE (OUTPATIENT)
Dept: ENDOCRINOLOGY | Facility: CLINIC | Age: 25
End: 2020-03-20

## 2020-03-20 NOTE — TELEPHONE ENCOUNTER
----- Message from Dipak Montemayor MD sent at 3/16/2020 10:52 AM CDT -----  [employer] message sent to pt. It doesn't look like he has seen it. If he doesn't read it in a few days; please give him a call.

## 2020-03-20 NOTE — TELEPHONE ENCOUNTER
[facility] message as follows:    Notes recorded by Dipak Montemayor MD on 3/11/2020 at 3:33 PM CDT   Magdy Weaver please find blood work results done at the time of your visit at endocrinology clinic.   1.  The C-peptide level is low as expected in type 1 diabetes.  Logan antibody is also high as expected in type 1 diabetes.   2.  Thyroid level is within normal limits   3.  Kidney function test and electrolytes are within the normal limits   4.  Your cholesterol test results indicated that the bad cholesterol LDL is a still on the high side- higher than desired therefore I recommend increasing the atorvastatin dose from current 20 mg daily to 40 mg daily.  I have sent a prescription to your pharmacy.       Please let me know if you have any questions regarding these results.     Dipak Montemayor MD           Attempted to contact patient. Left voicemail for patient to contact our office.       Marilu Decker RN  Endocrine Care Coordinator  [facility]

## 2020-04-14 NOTE — TELEPHONE ENCOUNTER
Patient has not returned call. Uncertain if patient has viewed results/ recommendations in [facility]. Again attempted to contact patient. Left voicemail for patient to contact our office.       Marilu Decker RN  Endocrine Care Coordinator  [facility]

## 2020-04-17 NOTE — TELEPHONE ENCOUNTER
Please note patient has not returned call/messages but per MyChart review, patient has viewed results and recommendations from Dr. Montemayor.      Marilu Decker RN  Endocrine Care Coordinator  [facility]

## 2020-04-28 DIAGNOSIS — E10.9 TYPE 1 DIABETES MELLITUS WITHOUT COMPLICATION (H): Primary | ICD-10-CM

## 2020-04-29 RX ORDER — INSULIN GLARGINE 100 [IU]/ML
40 INJECTION, SOLUTION SUBCUTANEOUS DAILY
Qty: 45 ML | Refills: 1 | Status: SHIPPED | OUTPATIENT
Start: 2020-04-29 | End: 2020-09-28

## 2020-04-29 RX ORDER — INSULIN LISPRO 100 [IU]/ML
INJECTION, SOLUTION INTRAVENOUS; SUBCUTANEOUS
Qty: 45 ML | Refills: 1 | Status: SHIPPED | OUTPATIENT
Start: 2020-04-29 | End: 2020-09-28

## 2020-04-29 NOTE — TELEPHONE ENCOUNTER
Reviewed Dr. Montemayor' progress note from 3/9/20. Prescriptions have not been completed yet by Dr. Montemayor. Prescriptions currently under previous MD. Updated dosing for Lantus and Humalog but need to confirm current daily dose of Humalog with patient. Left voicemail for patient to contact our office.       Marilu Decker RN  Endocrine Care Coordinator  [facility]'

## 2020-04-29 NOTE — TELEPHONE ENCOUNTER
insulin lispro (HUMALOG KWIKPEN) 100 UNIT/ML (1 unit dial) KWIKPEN     Last Written Prescription Date:  ?  Last Fill Quantity: ?   # refills: ?  Last Office Visit : 3/9/2020  Future Office visit:  6/8/2020  Routing refill request to provider for review/approval because:  Drug not on the [facility], [facility] or [facility] refill protocol or controlled substance  Medication is reported/historical    LANTUS SOLOSTAR 100 UNIT/ML soln     Last Written Prescription Date:  10/18/2019  Last Fill Quantity: ?,   # refills: 5  Last Office Visit : 3/9/2020  Future Office visit:  6/8/2020  Routing refill request to provider for review/approval because:  Drug not on the [facility], [facility] or [facility] refill protocol or controlled substance  Medication is reported/historical      Maria Elena Francois RN  Central Triage Red Flags/Med Refills

## 2020-04-29 NOTE — TELEPHONE ENCOUNTER
Patient returned call and confirms that his Humalog daily dose if roughly 30-40 units daily.       Will send prescriptions for Dr. Montemayor to sign.      Marilu Decker RN  Endocrine Care Coordinator  [facility]

## 2020-09-28 ENCOUNTER — OFFICE VISIT (OUTPATIENT)
Dept: ENDOCRINOLOGY | Facility: CLINIC | Age: 25
End: 2020-09-28
Payer: COMMERCIAL

## 2020-09-28 VITALS
BODY MASS INDEX: 35.54 KG/M2 | WEIGHT: 269.9 LBS | DIASTOLIC BLOOD PRESSURE: 92 MMHG | SYSTOLIC BLOOD PRESSURE: 133 MMHG | HEART RATE: 93 BPM | OXYGEN SATURATION: 98 %

## 2020-09-28 DIAGNOSIS — I10 ESSENTIAL HYPERTENSION: ICD-10-CM

## 2020-09-28 DIAGNOSIS — E10.9 TYPE 1 DIABETES MELLITUS WITHOUT COMPLICATION (H): Primary | ICD-10-CM

## 2020-09-28 DIAGNOSIS — E66.01 MORBID OBESITY (H): ICD-10-CM

## 2020-09-28 LAB — HBA1C MFR BLD: 8.4 % (ref 0–5.6)

## 2020-09-28 PROCEDURE — 83036 HEMOGLOBIN GLYCOSYLATED A1C: CPT | Performed by: INTERNAL MEDICINE

## 2020-09-28 PROCEDURE — 36415 COLL VENOUS BLD VENIPUNCTURE: CPT | Performed by: INTERNAL MEDICINE

## 2020-09-28 PROCEDURE — 95251 CONT GLUC MNTR ANALYSIS I&R: CPT | Performed by: INTERNAL MEDICINE

## 2020-09-28 PROCEDURE — 99214 OFFICE O/P EST MOD 30 MIN: CPT | Performed by: INTERNAL MEDICINE

## 2020-09-28 RX ORDER — INSULIN LISPRO 100 [IU]/ML
INJECTION, SOLUTION INTRAVENOUS; SUBCUTANEOUS
Qty: 60 ML | Refills: 1 | Status: SHIPPED | OUTPATIENT
Start: 2020-09-28 | End: 2021-09-03

## 2020-09-28 RX ORDER — INSULIN GLARGINE 100 [IU]/ML
40 INJECTION, SOLUTION SUBCUTANEOUS DAILY
Qty: 45 ML | Refills: 1 | Status: SHIPPED | OUTPATIENT
Start: 2020-09-28 | End: 2020-11-06

## 2020-09-28 NOTE — PROGRESS NOTES
Endocrinology Clinic Visit    Chief Complaint: Diabetes     Information obtained from:Patient    Subjective:         HPI: Owen Solomon is a 25 year old male with history of type 1 diabetes who is here for routine follow up.   He was diagnosed with type 1 diabetes after presenting with DKA in December 2018.  At that time he has had also 40-50 pounds weight loss.  He was initiated on insulin therapy since then.    Hemoglobin A1C   Date Value Ref Range Status   09/28/2020 8.4 (A) 0 - 5.6 % Final   03/09/2020 7.8 (A) 0 - 5.6 % Final                       He has hypoglycemia awareness.  He reports consistently missing lunchtime mealtime bolus.    He eats 3 meals per day.  He works in [employer] field and his schedule varies from day-to-day.  Typically he wakes up between 4:30 AM-8:30 AM in the morning.  Lunch is afternoon.  Dinner is between 5-6 PM in the evening.  He does not snack after dinner most of the time.  He has gained back of his weight he has lost prior to his diagnosis of type 1 diabetes.  He has gained about 20+ pounds since I saw him last and he attributes that due to working from home and snacking more.  Chronic complications of diabetes  Eye examination up-to-date-no diabetic retinopathy  No microalbuminuria last eye exam was December of thousand 19  Blood pressure showed slightly increased diastolic blood pressure at 133/92-see below   Microfilament examination completed today.  No peripheral neuropathy  Non-smoker     No Known Allergies    Current Outpatient Medications   Medication Sig Dispense Refill     cetirizine (ZYRTEC) 10 MG tablet Take 10 mg by mouth daily       Continuous Blood Gluc  (FREESTYLE PAUL 14 DAY READER) ADELAIDE 5 times daily       insulin lispro (HUMALOG KWIKPEN) 100 UNIT/ML (1 unit dial) KWIKPEN 1 unit per 7 grams at breakfast, lunch, dinner and snacks. Uses up to 50  Units daily. 60 mL 1     KETOSTIX test strip by TABLE SOLN route as needed 25 each 0     LANTUS SOLOSTAR 100  UNIT/ML soln Inject 40 Units Subcutaneous daily 45 mL 1     Multiple Vitamins-Minerals (SUPER THERA CHANO M) TABS Take 1 tablet by mouth daily       Semaglutide,0.25 or 0.5MG/DOS, 2 MG/1.5ML SOPN Inject 0.25 mg Subcutaneous every 7 days Increase it to 0.5 mg every 7 days after 4 weeks. 6 mL 1     Glucagon 3 MG/DOSE POWD Spray 1 each in nostril as needed 1 each 0       Review of Systems     8 point review system (Constitutional, HENT, Eyes, Respiratory, Cardiovascular, Gastrointestinal, Genitourinary, Musculoskeletal,Neurological, Psychiatric/Behavioural, Endocrine) is negative or is as per HPI above    Past Medical History:   Diagnosis Date     Type 1 diabetes (H)        History reviewed. No pertinent surgical history.    Family History   Problem Relation Age of Onset     Thyroid Disease Mother      Diabetes Type 1 Brother      Heart Disease Maternal Grandfather        Social History     Socioeconomic History     Marital status: Single     Spouse name: None     Number of children: None     Years of education: None     Highest education level: None   Occupational History     None   Social Needs     Financial resource strain: None     Food insecurity:     Worry: None     Inability: None     Transportation needs:     Medical: None     Non-medical: None   Tobacco Use     Smoking status: Current Some Day Smoker     Types: Cigars     Smokeless tobacco: Never Used   Substance and Sexual Activity     Alcohol use: None     Drug use: None     Sexual activity: None   Lifestyle     Physical activity:     Days per week: None     Minutes per session: None     Stress: None   Relationships     Social connections:     Talks on phone: None     Gets together: None     Attends Sabianist service: None     Active member of club or organization: None     Attends meetings of clubs or organizations: None     Relationship status: None     Intimate partner violence:     Fear of current or ex partner: None     Emotionally abused: None      Physically abused: None     Forced sexual activity: None   Other Topics Concern     None   Social History Narrative     None       Objective:   BP (!) 133/92 (BP Location: Left arm, Patient Position: Sitting, Cuff Size: Adult Large)   Pulse 93   Wt 122.4 kg (269 lb 14.4 oz)   SpO2 98%   BMI 35.54 kg/m    Constitutional: Pleasant no acute cardiopulmonary distress.   EYES: anicteric, normal extra-ocular movements, no lid lag or retraction, is equal and reactive to light bilaterally.  HEENT: Thyroid examination: Unremarkable  Cardiovascular: RRR, S1, S2 normal.   Pulmonary/Chest: CTAB. No wheezing or rales.   Abdominal: +BS. Non tender to palpation.  Stretch marks: Few, narrow stretch marks involving the lateral side of the abdomen  Neurological: Alert and oriented.  No tremor and reflexes are symmetrical bilaterally and within the normal limits. Muscle strength 5/5.   Extremities: No edema.  Sensory exam of the foot is normal, tested with the monofilament. Good pulses, no lesions or ulcers, good peripheral pulses.  Psychological: appropriate mood and affect       In House Labs:       Hemoglobin A1C   Date Value Ref Range Status   09/28/2020 8.4 (A) 0 - 5.6 % Final   03/09/2020 7.8 (A) 0 - 5.6 % Final       Assessment/Treatment Plan:      Type 1 diabetes recently diagnosed December 2018 after presenting with DKA and 50 pound weight loss:    He has not been consistently checking blood sugars as documented above there are days when there is no documentation of blood sugar.  He has been also missing carbohydrate boluses at lunchtime and I have advised today consistently checking his blood sugar and consistently taking his mealtime bolus.  He benefits from adding GLP-1 agonist as part of type 1 diabetes and obesity management.  He does not have history of pancreatitis or medullary thyroid cancer in the family.  Side effects of semaglutide discussed in detail.  We are starting him on 0.5 mg and to be increased to 0.5 mg  after 4 weeks.  He will continue his current insulin setting while adding semaglutide.    Continue Basaglar insulin 40 units daily  Carbohydrate coverage 1 unit for every 7 g of carbohydrate with meals and snacks.  Continue current correction scale of 1 unit for every 35 above 140.    He has glucagon for emergency use.  He is not interested to pursue insulin pump at this point in time.      Chronic complications of diabetes  Eye examination up-to-date-no diabetic retinopathy  No microalbuminuria   last eye exam was December 2019 no retinopathy per report  Blood pressure showed slightly increased diastolic blood pressure at 133/92-see below   Microfilament examination completed today.  No peripheral neuropathy  Non-smoker  Anti-MARIBELL antibody above 250 with undetectable C-peptide level.    Prior blood work which were done at an outside facility including basic metabolic panel, LFTs, and cholesterol panels were reviewed in detail.      Hyperlipidemia: Previously started on atorvastatin however he tells me today that he is never started this medication.  We will recheck his lipid panel at his next visit which was ordered today.    Hypertension-stage I hypertension based on new guidelines 133/92.  Is not clear if this is whitecoat hypertension or persistent hypertension.  Will benefit from 24-hour ambulatory blood pressure.  In the meantime discussed DASH diet, regular exercise, weight loss, and detailed instructions documented below provided to the patient.  Will follow this issue up at his next visit as well.    Patient Instructions   Blood work today.     Lantus dose to 40 units daily.     Continue with mealtime insulin (HUMOLOG)  of 1 unit for every 7 grams of carbohydrates with meals and snacks.      Please follow this correction scale with meals three times per day and at bedtime USING HUMOLOG insulin.   For  to 174 give 1 units.  For  to 209 give 2 units.  For  to 244 give 3 units.  For  to  279 give 4 units.  For  to 314 give 5 units.  For  to 359 give 6 units.  For BG >/= to 360 give 7 units.    Start Ozempic per instructions provided.       [facility]-Department of Endocrinology  Clarissa Francis RN, Diabetes Educator: [phone]  Clinic Nurses HARRISON Michel; CMA's: Jane [phone]  WILL AndradeN : [phone]  Clinic Fax: [phone]  On-Call Endocrine at the [place] (after hours/weekends): [phone] option 4  Scheduling Line: [phone]    Appointment Reminders:  * Please bring meter with for staff to download  * If you are due ONLY for an A1C, it is scheduled with the nurse and will be done in clinic. You do not need to schedule a lab appointment. Fasting is not required for an A1C.  * Refill request should be submitted to your pharmacy. They will contact clinic for approval.      Patient Education     High Blood Pressure, New, Begin Treatment  Your blood pressure was high enough today to start treatment with medicines. Often healthcare providers don t know what causes high blood pressure (hypertension). But it can be controlled with lifestyle changes and medicines. High blood pressure usually has no symptoms. But it can sometimes cause headache, dizziness, blurred vision, a rushing sound in your ears, chest pain, or shortness of breath. But even without symptoms, high blood pressure that s not treated raises your risk for heart attack, heart failure, and stroke. High blood pressure is a serious health risk and shouldn t be ignored.    Blood pressure measurements are given as 2 numbers. Systolic blood pressure is the upper number. This is the pressure when the heart contracts. Diastolic blood pressure is the lower number. This is the pressure when the heart relaxes between beats. You will see your blood pressure readings written together. For example, a person with a systolic pressure of 118 and a diastolic pressure of 78 will have 118/78 written in the  medical record.   Blood pressure is categorized as normal, elevated, or stage 1 or stage 2 high blood pressure:    Normal blood pressure is systolic of less than 120 and diastolic of less than 80 (120/80)    Elevated blood pressure is systolic of 120 to 129 and diastolic less than 80    Stage 1 high blood pressure is systolic is 130 to 139 or diastolic between 80 to 89    Stage 2 high blood pressure is when systolic is 140 or higher or the diastolic is 90 or higher  Home care  If you have high blood pressure, you should do what is listed below to lower your blood pressure. If you are taking medicines for high blood pressure, these methods may reduce or end your need for medicines in the future.    Begin a weight-loss program if you are overweight.    Cut back on how much salt you get in your diet. Here s how to do this:  ? Don t eat foods that have a lot of salt. These include olives, pickles, smoked meats, and salted potato chips.  ? Don t add salt to your food at the table.  ? Use only small amounts of salt when cooking.  ? Review food labels to track how much salt is in prepared foods.  ? When eating out, ask that no additional salt be added to your food order.    Begin an exercise program. Talk with your healthcare provider about the type of exercise program that would be best for you. It doesn't have to be hard. Even brisk walking for 20 minutes 3 times a week is a good form of exercise.    Don t take medicines that have heart stimulants. This includes many over-the-counter cold and sinus decongestant pills and sprays, as well as diet pills. Check the warnings about high blood pressure on the label. Before purchasing any over-the-counter medicines or supplements, always ask the pharmacist about the product's potential interaction with your high blood pressure and your high blood pressure medicines.    Stimulants such as amphetamine or cocaine could be lethal for someone with high blood pressure. Never take  these.    Limit how much caffeine you get in your diet. Switch to caffeine-free products.    Stop smoking. If you are a long-time smoker, this can be hard. Enroll in a stop-smoking program to make it more likely that you will quit for good.    Learn how to handle stress. This is an important part of any program to lower blood pressure. Learn about relaxation methods like meditation, yoga, or biofeedback.    If your provider prescribed medicines, take them exactly as directed. Missing doses may cause your blood pressure get out of control.    If you miss a dose or doses, check with your healthcare provider or pharmacist about what to do.    Consider buying an automatic blood pressure machine. Your provider can make a recommendation. You can get one of these at most pharmacies.  The American Heart Association recommends the following guidelines for home blood pressure monitoring:    Don't smoke or drink coffee or other caffeinated drinks for 30 minutes before taking your blood pressure.    Go to the bathroom before the test.    Relax for 5 minutes before taking the measurement.    Sit with your back supported (don't sit on a couch or soft chair); keep your feet on the floor uncrossed. Place your arm on a solid flat surface (like a table) with the upper part of the arm at heart level. Place the middle of the cuff directly above the bend of the elbow. Check the monitor's instruction manual for an illustration.    Take multiple readings. When you measure, take 2 to 3 readings one minute apart and record all of the results.    Take your blood pressure at the same time every day, or as your healthcare provider recommends.    Record the date, time, and blood pressure reading.    Take the record with you to your next medical appointment. If your blood pressure monitor has a built-in memory, simply take the monitor with you to your next appointment.    Call your provider if you have several high readings. Don't be frightened  by a single high blood pressure reading, but if you get several high readings, check in with your healthcare provider.    Note: When blood pressure reaches a systolic (top number) of 180 or higher OR diastolic (bottom number) of 110 or higher, seek emergency medical treatment.  Follow-up care  we will follow up on blood pressure at your next visit       I will contact the patient with the test results.  Return to clinic in 3 months.  Test and/or medications prescribed today:  Orders Placed This Encounter   Procedures     Basic metabolic panel     Lipid panel reflex to direct LDL Fasting     TSH with free T4 reflex     Albumin Random Urine Quantitative with Creat Ratio         Dipak Montemayor MD  Staff Endocrinologist    [phone]  Division of Endocrinology and Diabetes

## 2020-09-28 NOTE — LETTER
9/28/2020         RE: Owen Solomon  [address] [address] [place] [id number]        Dear Colleague,    Thank you for referring your patient, Owen Solomon, to the [facility]. Please see a copy of my visit note below.    Endocrinology Clinic Visit    Chief Complaint: Diabetes     Information obtained from:Patient    Subjective:         HPI: Owen Solomon is a 25 year old male with history of type 1 diabetes who is here for routine follow up.   He was diagnosed with type 1 diabetes after presenting with DKA in December 2018.  At that time he has had also 40-50 pounds weight loss.  He was initiated on insulin therapy since then.    Hemoglobin A1C   Date Value Ref Range Status   09/28/2020 8.4 (A) 0 - 5.6 % Final   03/09/2020 7.8 (A) 0 - 5.6 % Final                       He has hypoglycemia awareness.  He reports consistently missing lunchtime mealtime bolus.    He eats 3 meals per day.  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  Typically he wakes up between 4:30 AM-8:30 AM in the morning.  Lunch is afternoon.  Dinner is between 5-6 PM in the evening.  He does not snack after dinner most of the time.  He has gained back of his weight he has lost prior to his diagnosis of type 1 diabetes.  He has gained about 20+ pounds since I saw him last and he attributes that due to working from home and snacking more.  Chronic complications of diabetes  Eye examination up-to-date-no diabetic retinopathy  No microalbuminuria last eye exam was December of thousand 19  Blood pressure showed slightly increased diastolic blood pressure at 133/92-see below   Microfilament examination completed today.  No peripheral neuropathy  Non-smoker     No Known Allergies    Current Outpatient Medications   Medication Sig Dispense Refill     cetirizine (ZYRTEC) 10 MG tablet Take 10 mg by mouth daily       Continuous Blood Gluc  (FREESTYLE PAUL 14 DAY READER) ADELAIDE 5 times daily        insulin lispro (HUMALOG KWIKPEN) 100 UNIT/ML (1 unit dial) KWIKPEN 1 unit per 7 grams at breakfast, lunch, dinner and snacks. Uses up to 50  Units daily. 60 mL 1     KETOSTIX test strip by TABLE SOLN route as needed 25 each 0     LANTUS SOLOSTAR 100 UNIT/ML soln Inject 40 Units Subcutaneous daily 45 mL 1     Multiple Vitamins-Minerals (SUPER THERA CHANO M) TABS Take 1 tablet by mouth daily       Semaglutide,0.25 or 0.5MG/DOS, 2 MG/1.5ML SOPN Inject 0.25 mg Subcutaneous every 7 days Increase it to 0.5 mg every 7 days after 4 weeks. 6 mL 1     Glucagon 3 MG/DOSE POWD Spray 1 each in nostril as needed 1 each 0       Review of Systems     8 point review system (Constitutional, HENT, Eyes, Respiratory, Cardiovascular, Gastrointestinal, Genitourinary, Musculoskeletal,Neurological, Psychiatric/Behavioural, Endocrine) is negative or is as per HPI above    Past Medical History:   Diagnosis Date     Type 1 diabetes (H)        History reviewed. No pertinent surgical history.    Family History   Problem Relation Age of Onset     Thyroid Disease Mother      Diabetes Type 1 Brother      Heart Disease Maternal Grandfather        Social History     Socioeconomic History     Marital status: Single     Spouse name: None     Number of children: None     Years of education: None     Highest education level: None   Occupational History     None   Social Needs     Financial resource strain: None     Food insecurity:     Worry: None     Inability: None     Transportation needs:     Medical: None     Non-medical: None   Tobacco Use     Smoking status: Current Some Day Smoker     Types: Cigars     Smokeless tobacco: Never Used   Substance and Sexual Activity     Alcohol use: None     Drug use: None     Sexual activity: None   Lifestyle     Physical activity:     Days per week: None     Minutes per session: None     Stress: None   Relationships     Social connections:     Talks on phone: None     Gets together: None     Attends [employer]  service: None     Active member of club or organization: None     Attends meetings of clubs or organizations: None     Relationship status: None     Intimate partner violence:     Fear of current or ex partner: None     Emotionally abused: None     Physically abused: None     Forced sexual activity: None   Other Topics Concern     None   Social History Narrative     None       Objective:   BP (!) 133/92 (BP Location: Left arm, Patient Position: Sitting, Cuff Size: Adult Large)   Pulse 93   Wt 122.4 kg (269 lb 14.4 oz)   SpO2 98%   BMI 35.54 kg/m    Constitutional: Pleasant no acute cardiopulmonary distress.   EYES: anicteric, normal extra-ocular movements, no lid lag or retraction, is equal and reactive to light bilaterally.  HEENT: Thyroid examination: Unremarkable  Cardiovascular: RRR, S1, S2 normal.   Pulmonary/Chest: CTAB. No wheezing or rales.   Abdominal: +BS. Non tender to palpation.  Stretch marks: Few, narrow stretch marks involving the lateral side of the abdomen  Neurological: Alert and oriented.  No tremor and reflexes are symmetrical bilaterally and within the normal limits. Muscle strength 5/5.   Extremities: No edema.  Sensory exam of the foot is normal, tested with the monofilament. Good pulses, no lesions or ulcers, good peripheral pulses.  Psychological: appropriate mood and affect       In House Labs:       Hemoglobin A1C   Date Value Ref Range Status   09/28/2020 8.4 (A) 0 - 5.6 % Final   03/09/2020 7.8 (A) 0 - 5.6 % Final       Assessment/Treatment Plan:      Type 1 diabetes recently diagnosed December 2018 after presenting with DKA and 50 pound weight loss:    He has not been consistently checking blood sugars as documented above there are days when there is no documentation of blood sugar.  He has been also missing carbohydrate boluses at lunchtime and I have advised today consistently checking his blood sugar and consistently taking his mealtime bolus.  He benefits from adding GLP-1  agonist as part of type 1 diabetes and obesity management.  He does not have history of pancreatitis or medullary thyroid cancer in the family.  Side effects of semaglutide discussed in detail.  We are starting him on 0.5 mg and to be increased to 0.5 mg after 4 weeks.  He will continue his current insulin setting while adding semaglutide.    Continue Basaglar insulin 40 units daily  Carbohydrate coverage 1 unit for every 7 g of carbohydrate with meals and snacks.  Continue current correction scale of 1 unit for every 35 above 140.    He has glucagon for emergency use.  He is not interested to pursue insulin pump at this point in time.      Chronic complications of diabetes  Eye examination up-to-date-no diabetic retinopathy  No microalbuminuria   last eye exam was December 2019 no retinopathy per report  Blood pressure showed slightly increased diastolic blood pressure at 133/92-see below   Microfilament examination completed today.  No peripheral neuropathy  Non-smoker  Anti-MARIBELL antibody above 250 with undetectable C-peptide level.    Prior blood work which were done at an outside facility including basic metabolic panel, LFTs, and cholesterol panels were reviewed in detail.      Hyperlipidemia: Previously started on atorvastatin however he tells me today that he is never started this medication.  We will recheck his lipid panel at his next visit which was ordered today.    Hypertension-stage I hypertension based on new guidelines 133/92.  Is not clear if this is whitecoat hypertension or persistent hypertension.  Will benefit from 24-hour ambulatory blood pressure.  In the meantime discussed DASH diet, regular exercise, weight loss, and detailed instructions documented below provided to the patient.  Will follow this issue up at his next visit as well.    Patient Instructions   Blood work today.     Lantus dose to 40 units daily.     Continue with mealtime insulin (HUMOLOG)  of 1 unit for every 7 grams of  carbohydrates with meals and snacks.      Please follow this correction scale with meals three times per day and at bedtime USING HUMOLOG insulin.   For  to 174 give 1 units.  For  to 209 give 2 units.  For  to 244 give 3 units.  For  to 279 give 4 units.  For  to 314 give 5 units.  For  to 359 give 6 units.  For BG >/= to 360 give 7 units.    Start Ozempic per instructions provided.       [facility]  Clarissa Francis RN, Diabetes Educator: [phone]  Clinic Nurses HARRISON Michel; CMA's: Jane [phone]  SPENCER Andrade : [phone]  Clinic Fax: [phone]  On-Call Endocrine at [facility] (after hours/weekends): [phone] option 4  Scheduling Line: [phone]    Appointment Reminders:  * Please bring meter with for staff to download  * If you are due ONLY for an A1C, it is scheduled with the nurse and will be done in clinic. You do not need to schedule a lab appointment. Fasting is not required for an A1C.  * Refill request should be submitted to your pharmacy. They will contact clinic for approval.      Patient Education     High Blood Pressure, New, Begin Treatment  Your blood pressure was high enough today to start treatment with medicines. Often healthcare providers don t know what causes high blood pressure (hypertension). But it can be controlled with lifestyle changes and medicines. High blood pressure usually has no symptoms. But it can sometimes cause headache, dizziness, blurred vision, a rushing sound in your ears, chest pain, or shortness of breath. But even without symptoms, high blood pressure that s not treated raises your risk for heart attack, heart failure, and stroke. High blood pressure is a serious health risk and shouldn t be ignored.    Blood pressure measurements are given as 2 numbers. Systolic blood pressure is the upper number. This is the pressure when the heart contracts. Diastolic blood  pressure is the lower number. This is the pressure when the heart relaxes between beats. You will see your blood pressure readings written together. For example, a person with a systolic pressure of 118 and a diastolic pressure of 78 will have 118/78 written in the medical record.   Blood pressure is categorized as normal, elevated, or stage 1 or stage 2 high blood pressure:    Normal blood pressure is systolic of less than 120 and diastolic of less than 80 (120/80)    Elevated blood pressure is systolic of 120 to 129 and diastolic less than 80    Stage 1 high blood pressure is systolic is 130 to 139 or diastolic between 80 to 89    Stage 2 high blood pressure is when systolic is 140 or higher or the diastolic is 90 or higher  Home care  If you have high blood pressure, you should do what is listed below to lower your blood pressure. If you are taking medicines for high blood pressure, these methods may reduce or end your need for medicines in the future.    Begin a weight-loss program if you are overweight.    Cut back on how much salt you get in your diet. Here s how to do this:  ? Don t eat foods that have a lot of salt. These include olives, pickles, smoked meats, and salted potato chips.  ? Don t add salt to your food at the table.  ? Use only small amounts of salt when cooking.  ? Review food labels to track how much salt is in prepared foods.  ? When eating out, ask that no additional salt be added to your food order.    Begin an exercise program. Talk with your healthcare provider about the type of exercise program that would be best for you. It doesn't have to be hard. Even brisk walking for 20 minutes 3 times a week is a good form of exercise.    Don t take medicines that have heart stimulants. This includes many over-the-counter cold and sinus decongestant pills and sprays, as well as diet pills. Check the warnings about high blood pressure on the label. Before purchasing any over-the-counter medicines or  supplements, always ask the pharmacist about the product's potential interaction with your high blood pressure and your high blood pressure medicines.    Stimulants such as amphetamine or cocaine could be lethal for someone with high blood pressure. Never take these.    Limit how much caffeine you get in your diet. Switch to caffeine-free products.    Stop smoking. If you are a long-time smoker, this can be hard. Enroll in a stop-smoking program to make it more likely that you will quit for good.    Learn how to handle stress. This is an important part of any program to lower blood pressure. Learn about relaxation methods like meditation, yoga, or biofeedback.    If your provider prescribed medicines, take them exactly as directed. Missing doses may cause your blood pressure get out of control.    If you miss a dose or doses, check with your healthcare provider or pharmacist about what to do.    Consider buying an automatic blood pressure machine. Your provider can make a recommendation. You can get one of these at most pharmacies.  The American Heart Association recommends the following guidelines for home blood pressure monitoring:    Don't smoke or drink coffee or other caffeinated drinks for 30 minutes before taking your blood pressure.    Go to the bathroom before the test.    Relax for 5 minutes before taking the measurement.    Sit with your back supported (don't sit on a couch or soft chair); keep your feet on the floor uncrossed. Place your arm on a solid flat surface (like a table) with the upper part of the arm at heart level. Place the middle of the cuff directly above the bend of the elbow. Check the monitor's instruction manual for an illustration.    Take multiple readings. When you measure, take 2 to 3 readings one minute apart and record all of the results.    Take your blood pressure at the same time every day, or as your healthcare provider recommends.    Record the date, time, and blood pressure  reading.    Take the record with you to your next medical appointment. If your blood pressure monitor has a built-in memory, simply take the monitor with you to your next appointment.    Call your provider if you have several high readings. Don't be frightened by a single high blood pressure reading, but if you get several high readings, check in with your healthcare provider.    Note: When blood pressure reaches a systolic (top number) of 180 or higher OR diastolic (bottom number) of 110 or higher, seek emergency medical treatment.  Follow-up care  we will follow up on blood pressure at your next visit       I will contact the patient with the test results.  Return to clinic in 3 months.  Test and/or medications prescribed today:  Orders Placed This Encounter   Procedures     Basic metabolic panel     Lipid panel reflex to direct LDL Fasting     TSH with free T4 reflex     Albumin Random Urine Quantitative with Creat Ratio         Dipak Montemayor MD  Staff Endocrinologist    [phone]  Division of Endocrinology and Diabetes        Again, thank you for allowing me to participate in the care of your patient.        Sincerely,        Dipak Montemayor MD

## 2020-09-28 NOTE — PATIENT INSTRUCTIONS
Blood work today.     Lantus dose to 40 units daily.     Continue with mealtime insulin (HUMOLOG)  of 1 unit for every 7 grams of carbohydrates with meals and snacks.      Please follow this correction scale with meals three times per day and at bedtime USING HUMOLOG insulin.   For  to 174 give 1 units.  For  to 209 give 2 units.  For  to 244 give 3 units.  For  to 279 give 4 units.  For  to 314 give 5 units.  For  to 359 give 6 units.  For BG >/= to 360 give 7 units.    Start Ozempic per instructions provided.       [facility]  Clarissa Francis RN, Diabetes Educator: [phone]  Clinic Nurses HARRISON Michel; CMA's: Jane [phone]  SPENCER Andrade : [phone]  Clinic Fax: [phone]  On-Call Endocrine at the [place] (after hours/weekends): [phone] option 4  Scheduling Line: [phone]    Appointment Reminders:  * Please bring meter with for staff to download  * If you are due ONLY for an A1C, it is scheduled with the nurse and will be done in clinic. You do not need to schedule a lab appointment. Fasting is not required for an A1C.  * Refill request should be submitted to your pharmacy. They will contact clinic for approval.      Patient Education     High Blood Pressure, New, Begin Treatment  Your blood pressure was high enough today to start treatment with medicines. Often healthcare providers don t know what causes high blood pressure (hypertension). But it can be controlled with lifestyle changes and medicines. High blood pressure usually has no symptoms. But it can sometimes cause headache, dizziness, blurred vision, a rushing sound in your ears, chest pain, or shortness of breath. But even without symptoms, high blood pressure that s not treated raises your risk for heart attack, heart failure, and stroke. High blood pressure is a serious health risk and shouldn t be ignored.    Blood pressure measurements are  given as 2 numbers. Systolic blood pressure is the upper number. This is the pressure when the heart contracts. Diastolic blood pressure is the lower number. This is the pressure when the heart relaxes between beats. You will see your blood pressure readings written together. For example, a person with a systolic pressure of 118 and a diastolic pressure of 78 will have 118/78 written in the medical record.   Blood pressure is categorized as normal, elevated, or stage 1 or stage 2 high blood pressure:    Normal blood pressure is systolic of less than 120 and diastolic of less than 80 (120/80)    Elevated blood pressure is systolic of 120 to 129 and diastolic less than 80    Stage 1 high blood pressure is systolic is 130 to 139 or diastolic between 80 to 89    Stage 2 high blood pressure is when systolic is 140 or higher or the diastolic is 90 or higher  Home care  If you have high blood pressure, you should do what is listed below to lower your blood pressure. If you are taking medicines for high blood pressure, these methods may reduce or end your need for medicines in the future.    Begin a weight-loss program if you are overweight.    Cut back on how much salt you get in your diet. Here s how to do this:  ? Don t eat foods that have a lot of salt. These include olives, pickles, smoked meats, and salted potato chips.  ? Don t add salt to your food at the table.  ? Use only small amounts of salt when cooking.  ? Review food labels to track how much salt is in prepared foods.  ? When eating out, ask that no additional salt be added to your food order.    Begin an exercise program. Talk with your healthcare provider about the type of exercise program that would be best for you. It doesn't have to be hard. Even brisk walking for 20 minutes 3 times a week is a good form of exercise.    Don t take medicines that have heart stimulants. This includes many over-the-counter cold and sinus decongestant pills and sprays, as  well as diet pills. Check the warnings about high blood pressure on the label. Before purchasing any over-the-counter medicines or supplements, always ask the pharmacist about the product's potential interaction with your high blood pressure and your high blood pressure medicines.    Stimulants such as amphetamine or cocaine could be lethal for someone with high blood pressure. Never take these.    Limit how much caffeine you get in your diet. Switch to caffeine-free products.    Stop smoking. If you are a long-time smoker, this can be hard. Enroll in a stop-smoking program to make it more likely that you will quit for good.    Learn how to handle stress. This is an important part of any program to lower blood pressure. Learn about relaxation methods like meditation, yoga, or biofeedback.    If your provider prescribed medicines, take them exactly as directed. Missing doses may cause your blood pressure get out of control.    If you miss a dose or doses, check with your healthcare provider or pharmacist about what to do.    Consider buying an automatic blood pressure machine. Your provider can make a recommendation. You can get one of these at most pharmacies.  The American Heart Association recommends the following guidelines for home blood pressure monitoring:    Don't smoke or drink coffee or other caffeinated drinks for 30 minutes before taking your blood pressure.    Go to the bathroom before the test.    Relax for 5 minutes before taking the measurement.    Sit with your back supported (don't sit on a couch or soft chair); keep your feet on the floor uncrossed. Place your arm on a solid flat surface (like a table) with the upper part of the arm at heart level. Place the middle of the cuff directly above the bend of the elbow. Check the monitor's instruction manual for an illustration.    Take multiple readings. When you measure, take 2 to 3 readings one minute apart and record all of the results.    Take your  blood pressure at the same time every day, or as your healthcare provider recommends.    Record the date, time, and blood pressure reading.    Take the record with you to your next medical appointment. If your blood pressure monitor has a built-in memory, simply take the monitor with you to your next appointment.    Call your provider if you have several high readings. Don't be frightened by a single high blood pressure reading, but if you get several high readings, check in with your healthcare provider.    Note: When blood pressure reaches a systolic (top number) of 180 or higher OR diastolic (bottom number) of 110 or higher, seek emergency medical treatment.  Follow-up care  we will follow up on blood pressure at your next visit

## 2020-09-28 NOTE — NURSING NOTE
Owen Solomon's goals for this visit include:   Chief Complaint   Patient presents with     Diabetes     He requests these members of his care team be copied on today's visit information: yes    PCP: Liberty King    Referring Provider:  Liberty King  [facility]  [address] [place] [address]    BP (!) 146/91 (BP Location: Left arm, Patient Position: Sitting, Cuff Size: Adult Large)   Pulse 93   Wt 122.4 kg (269 lb 14.4 oz)   SpO2 98%   BMI 35.54 kg/m      Do you need any medication refills at today's visit? Yes    Jane Martin CMA  Adult Endocrinology  [facility]

## 2020-10-06 ENCOUNTER — TELEPHONE (OUTPATIENT)
Dept: ENDOCRINOLOGY | Facility: CLINIC | Age: 25
End: 2020-10-06

## 2020-10-06 DIAGNOSIS — E10.9 TYPE 1 DIABETES MELLITUS WITHOUT COMPLICATION (H): Primary | ICD-10-CM

## 2020-10-06 DIAGNOSIS — E66.01 MORBID OBESITY (H): ICD-10-CM

## 2020-10-06 NOTE — TELEPHONE ENCOUNTER
Message from [facility] pharmacy states Ozempic is not covered by the patients insurance. Please send alternative or submit PA.    Insurance number: [phone]  Id # - [id number]    Jane Martin CMA  Adult Endocrinology  [facility]

## 2020-10-06 NOTE — TELEPHONE ENCOUNTER
Patient notified prescription changed to Trulicity.    Will My Chart patient information to print Trulicity savings card if covered.    Lupe Cruz LPN  Diabetes Clinic Coordinator   Adult Endocrinology and Pediatric Specialty Clinics  [facility]

## 2020-11-05 DIAGNOSIS — E10.9 TYPE 1 DIABETES MELLITUS WITHOUT COMPLICATION (H): ICD-10-CM

## 2020-11-05 NOTE — TELEPHONE ENCOUNTER
Will forward to KWADWO Escudeor to review 11/6/2020.    Lupe Cruz LPN  Diabetes Clinic Coordinator   Adult Endocrinology and Pediatric Specialty Clinics  [facility]

## 2020-11-06 RX ORDER — INSULIN GLARGINE 100 [IU]/ML
40 INJECTION, SOLUTION SUBCUTANEOUS DAILY
Qty: 45 ML | Refills: 1 | Status: SHIPPED | OUTPATIENT
Start: 2020-11-06 | End: 2021-04-26

## 2020-11-14 DIAGNOSIS — E10.9 TYPE 1 DIABETES MELLITUS WITHOUT COMPLICATION (H): Primary | ICD-10-CM

## 2020-11-15 NOTE — TELEPHONE ENCOUNTER
9/28/2020  [facility] Dipak Cowart MD  Endocrinology, Diabetes, and Metabolism  [place]: 2/2/21    insulin pen needle

## 2020-12-23 DIAGNOSIS — E78.5 HYPERLIPIDEMIA LDL GOAL <70: ICD-10-CM

## 2020-12-29 RX ORDER — ATORVASTATIN CALCIUM 40 MG/1
40 TABLET, FILM COATED ORAL DAILY
Qty: 90 TABLET | Refills: 1 | Status: CANCELLED | OUTPATIENT
Start: 2020-12-29

## 2020-12-29 NOTE — TELEPHONE ENCOUNTER
Call to [facility] pharmacy, after receiving paper refill request, message left of no authorizations at this time as per dictation note of 9/28/20, medication never started by Owen and discontinued by Dr Montemayor on 9/28/20.

## 2021-01-03 ENCOUNTER — HEALTH MAINTENANCE LETTER (OUTPATIENT)
Age: 26
End: 2021-01-03

## 2021-02-01 DIAGNOSIS — E78.5 HYPERLIPIDEMIA LDL GOAL <70: Primary | ICD-10-CM

## 2021-04-10 DIAGNOSIS — E10.9 TYPE 1 DIABETES MELLITUS WITHOUT COMPLICATION (H): Primary | ICD-10-CM

## 2021-04-12 ENCOUNTER — MYC REFILL (OUTPATIENT)
Dept: ENDOCRINOLOGY | Facility: CLINIC | Age: 26
End: 2021-04-12

## 2021-04-12 DIAGNOSIS — E10.9 TYPE 1 DIABETES MELLITUS WITHOUT COMPLICATION (H): ICD-10-CM

## 2021-04-12 RX ORDER — FLASH GLUCOSE SCANNING READER
1 EACH MISCELLANEOUS
Qty: 1 EACH | Refills: 0 | Status: SHIPPED | OUTPATIENT
Start: 2021-04-12 | End: 2021-04-12

## 2021-04-12 RX ORDER — FLASH GLUCOSE SCANNING READER
1 EACH MISCELLANEOUS
Qty: 1 EACH | Refills: 0 | Status: SHIPPED | OUTPATIENT
Start: 2021-04-12

## 2021-04-14 DIAGNOSIS — E10.9 TYPE 1 DIABETES MELLITUS (H): Primary | ICD-10-CM

## 2021-04-14 RX ORDER — FLASH GLUCOSE SENSOR
KIT MISCELLANEOUS
COMMUNITY
Start: 2020-12-30 | End: 2021-04-14

## 2021-04-14 NOTE — TELEPHONE ENCOUNTER
"Message received from the patient,    \"Hello,     It looked my pharmacy sent a prescription request for freestyle glo monitor. I need a prescription filled for the actual sensors that go into the arm.\"    Jane Martin [facility]  Adult Endocrinology  [facility]    "

## 2021-04-15 RX ORDER — FLASH GLUCOSE SENSOR
KIT MISCELLANEOUS
Qty: 6 EACH | Refills: 3 | Status: SHIPPED | OUTPATIENT
Start: 2021-04-15 | End: 2022-02-01

## 2021-04-23 NOTE — PATIENT INSTRUCTIONS
Julee Weaver dose to 35 units daily.     Continue with mealtime insulin (HUMOLOG)  of 1 unit for every 7 grams of carbohydrates with meals and snacks.      Please follow this correction scale with meals three times per day and at bedtime USING HUMOLOG insulin.   For  to 174 give 1 units.  For  to 209 give 2 units.  For  to 244 give 3 units.  For  to 279 give 4 units.  For  to 314 give 5 units.  For  to 359 give 6 units.  For BG >/= to 360 give 7 units.    [facility]  Clarissa Francis RN, Diabetes Educator: [phone]  Clinic Nurses HARRISON Michel; CMA's: Jane [phone]  SPENCER Andrade : [phone]  Clinic Fax: [phone]  On-Call Endocrine at the [place] (after hours/weekends): [phone] option 4  Scheduling Line: [phone]

## 2021-04-25 ENCOUNTER — HEALTH MAINTENANCE LETTER (OUTPATIENT)
Age: 26
End: 2021-04-25

## 2021-04-26 ENCOUNTER — OFFICE VISIT (OUTPATIENT)
Dept: ENDOCRINOLOGY | Facility: CLINIC | Age: 26
End: 2021-04-26
Payer: COMMERCIAL

## 2021-04-26 VITALS
BODY MASS INDEX: 36.37 KG/M2 | WEIGHT: 276.2 LBS | DIASTOLIC BLOOD PRESSURE: 87 MMHG | SYSTOLIC BLOOD PRESSURE: 132 MMHG | OXYGEN SATURATION: 97 % | HEART RATE: 102 BPM

## 2021-04-26 DIAGNOSIS — E66.01 MORBID OBESITY (H): ICD-10-CM

## 2021-04-26 DIAGNOSIS — E78.5 HYPERLIPIDEMIA LDL GOAL <70: ICD-10-CM

## 2021-04-26 DIAGNOSIS — E10.9 TYPE 1 DIABETES MELLITUS WITHOUT COMPLICATION (H): ICD-10-CM

## 2021-04-26 LAB
ANION GAP SERPL CALCULATED.3IONS-SCNC: 6 MMOL/L (ref 3–14)
BUN SERPL-MCNC: 9 MG/DL (ref 7–30)
CALCIUM SERPL-MCNC: 9.1 MG/DL (ref 8.5–10.1)
CHLORIDE SERPL-SCNC: 107 MMOL/L (ref 94–109)
CHOLEST SERPL-MCNC: 249 MG/DL
CO2 SERPL-SCNC: 27 MMOL/L (ref 20–32)
CREAT SERPL-MCNC: 0.87 MG/DL (ref 0.66–1.25)
CREAT UR-MCNC: 89 MG/DL
GFR SERPL CREATININE-BSD FRML MDRD: >90 ML/MIN/{1.73_M2}
GLUCOSE SERPL-MCNC: 197 MG/DL (ref 70–99)
HBA1C MFR BLD: 8.4 % (ref 0–5.6)
HDLC SERPL-MCNC: 49 MG/DL
LDLC SERPL CALC-MCNC: 161 MG/DL
MICROALBUMIN UR-MCNC: 6 MG/L
MICROALBUMIN/CREAT UR: 6.58 MG/G CR (ref 0–17)
NONHDLC SERPL-MCNC: 200 MG/DL
POTASSIUM SERPL-SCNC: 4.4 MMOL/L (ref 3.4–5.3)
SODIUM SERPL-SCNC: 140 MMOL/L (ref 133–144)
TRIGL SERPL-MCNC: 196 MG/DL
TSH SERPL DL<=0.005 MIU/L-ACNC: 2.13 MU/L (ref 0.4–4)

## 2021-04-26 PROCEDURE — 36415 COLL VENOUS BLD VENIPUNCTURE: CPT | Performed by: INTERNAL MEDICINE

## 2021-04-26 PROCEDURE — 80061 LIPID PANEL: CPT | Performed by: INTERNAL MEDICINE

## 2021-04-26 PROCEDURE — 84443 ASSAY THYROID STIM HORMONE: CPT | Performed by: INTERNAL MEDICINE

## 2021-04-26 PROCEDURE — 80048 BASIC METABOLIC PNL TOTAL CA: CPT | Performed by: INTERNAL MEDICINE

## 2021-04-26 PROCEDURE — 83036 HEMOGLOBIN GLYCOSYLATED A1C: CPT | Performed by: INTERNAL MEDICINE

## 2021-04-26 PROCEDURE — 99215 OFFICE O/P EST HI 40 MIN: CPT | Performed by: INTERNAL MEDICINE

## 2021-04-26 PROCEDURE — 82043 UR ALBUMIN QUANTITATIVE: CPT | Performed by: INTERNAL MEDICINE

## 2021-04-26 RX ORDER — INSULIN GLARGINE 100 [IU]/ML
35 INJECTION, SOLUTION SUBCUTANEOUS DAILY
Qty: 45 ML | Refills: 1
Start: 2021-04-26 | End: 2022-02-01 | Stop reason: ALTCHOICE

## 2021-04-26 NOTE — LETTER
4/26/2021         RE: Owen Solomon  [address] [address] [place] [id number]        Dear Colleague,    Thank you for referring your patient, Owen Solomon, to the [facility]. Please see a copy of my visit note below.    Endocrinology Clinic Visit    Chief Complaint: RECHECK (Type 1 diabetes mellitus without complication )     Information obtained from:Patient    Subjective:         HPI: Owen Solomon is a 25 year old male with history of type 1 diabetes who is here for routine follow up.   He was diagnosed with type 1 diabetes after presenting with DKA in December 2018.  At that time he has had also 40-50 pounds weight loss.     Current regimen    Lantus dose to 40 units daily.     Continue with mealtime insulin (HUMOLOG)  of 1 unit for every 7 grams of carbohydrates with meals and snacks. (doesn't take lunch time coverage most of the time)    Correction scale 1:35    Misses lunch time meal bolus most of the time.               He has hypoglycemia awareness.  He reports consistently missing lunchtime mealtime bolus.    He eats 3 meals per day.  He works in [employer] field and his schedule varies from day-to-day.  Typically he wakes up between 4:30 AM-8:30 AM in the morning.  Lunch is afternoon.  Dinner is between 5-6 PM in the evening.  He does not snack after dinner most of the time.  He has gained back of his weight he has lost prior to his diagnosis of type 1 diabetes.  He has gained about 20+ pounds since I saw him last and he attributes that due to working from home and snacking more.  Wt Readings from Last 10 Encounters:   04/26/21 125.3 kg (276 lb 3.2 oz)   09/28/20 122.4 kg (269 lb 14.4 oz)   03/09/20 116.1 kg (256 lb)   12/09/19 109.5 kg (241 lb 6.5 oz)       Chronic complications of diabetes  Eye examination up-to-date-no diabetic retinopathy  No microalbuminuria last eye exam was December 2019  Blood pressure s  Microfilament examination completed today.  No  peripheral neuropathy  Non-smoker     No Known Allergies    Current Outpatient Medications   Medication Sig Dispense Refill     Continuous Blood Gluc  (FREESTYLE PAUL 14 DAY READER) ADELAIDE 1 Device 5 times daily Use to monitor blood sugars per manufacture instructions 1 each 0     Continuous Blood Gluc Sensor (FREESTYLE PAUL 14 DAY SENSOR) MISC USE AS DIRECTED 6 TIMES DAILY. CHANGE EVERY 14 DAYS AS DIRECTED 6 each 3     Glucagon 3 MG/DOSE POWD Spray 1 each in nostril as needed 1 each 0     insulin lispro (HUMALOG KWIKPEN) 100 UNIT/ML (1 unit dial) KWIKPEN 1 unit per 7 grams at breakfast, lunch, dinner and snacks. Uses up to 50  Units daily. 60 mL 1     insulin pen needle (32G X 4 MM) 32G X 4 MM miscellaneous Use 5 pen needles daily or as directed. 450 each 1     KETOSTIX test strip by TABLE SOLN route as needed 25 each 0     LANTUS SOLOSTAR 100 UNIT/ML soln Inject 35 Units Subcutaneous daily 45 mL 1     Multiple Vitamins-Minerals (SUPER THERA CHANO M) TABS Take 1 tablet by mouth daily       cetirizine (ZYRTEC) 10 MG tablet Take 10 mg by mouth daily       dulaglutide (TRULICITY) 1.5 MG/0.5ML pen Inject 0.75 mg Subcutaneous every 7 days (Patient not taking: Reported on 4/26/2021) 6 mL 1       Review of Systems     8 point review system (Constitutional, HENT, Eyes, Respiratory, Cardiovascular, Gastrointestinal, Genitourinary, Musculoskeletal,Neurological, Psychiatric/Behavioural, Endocrine) is negative or is as per HPI above    Past Medical History:   Diagnosis Date     Type 1 diabetes (H)        History reviewed. No pertinent surgical history.    Family History   Problem Relation Age of Onset     Thyroid Disease Mother      Diabetes Type 1 Brother      Heart Disease Maternal Grandfather          Objective:   /87 (BP Location: Left arm, Patient Position: Sitting, Cuff Size: Adult Large)   Pulse 102   Wt 125.3 kg (276 lb 3.2 oz)   SpO2 97%   BMI 36.37 kg/m    Constitutional: Pleasant no acute  cardiopulmonary distress.   EYES: anicteric, normal extra-ocular movements, no lid lag or retraction, is equal and reactive to light bilaterally.  HEENT: Thyroid examination: Unremarkable  Cardiovascular: RRR, S1, S2 normal.   Pulmonary/Chest: CTAB. No wheezing or rales.   Abdominal: +BS. Non tender to palpation.  Stretch marks: Few, narrow stretch marks involving the lateral side of the abdomen  Neurological: Alert and oriented.  No tremor and reflexes are symmetrical bilaterally and within the normal limits. Muscle strength 5/5.   Extremities: No edema.  Sensory exam of the foot is normal, tested with the monofilament. Good pulses, no lesions or ulcers, good peripheral pulses.)  Psychological: appropriate mood and affect   In House Labs:       Hemoglobin A1C   Date Value Ref Range Status   04/26/2021 8.4 (H) 0 - 5.6 % Final     Comment:     Results confirmed by repeat test  Normal <5.7% Prediabetes 5.7-6.4%  Diabetes 6.5% or higher - adopted from ADA   consensus guidelines.     09/28/2020 8.4 (A) 0 - 5.6 % Final   03/09/2020 7.8 (A) 0 - 5.6 % Final       Assessment/Treatment Plan:      Type 1 diabetes recently diagnosed December 2018 after presenting with DKA and 50 pound weight loss:    Checks BG at least 5 times per day.   Couldn't have GLP-1 agonist covered for weight loss.   Blood glucose currently uncontrolled and reviewing his data this is secondary to not taking mealtime bolus at lunchtime.  In fact Lantus dose seems generous and he has lows overnight and we are backing off on that.  Counseling provided to consistently take mealtime boluses.    Reduce Basaglar insulin 35 units daily  Carbohydrate coverage 1 unit for every 7 g of carbohydrate with meals and snacks.  Continue current correction scale of 1 unit for every 35 above 140.    He has glucagon for emergency use.  He is not interested to pursue insulin pump at this point in time.    Chronic complications of diabetes  Eye examination up-to-date-no  diabetic retinopathy  No microalbuminuria-checked today.   last eye exam no retinopathy per report  Blood pressure target less than 130/80.  Slightly higher than target.  Would like to work on lifestyle modification rather than adding medication at this time.  Microfilament examination completed today.  No peripheral neuropathy  Non-smoker  Anti-MARIBELL antibody above 250 with undetectable C-peptide level.    Prior blood work which were done at an outside facility including basic metabolic panel, LFTs, and cholesterol panels were reviewed in detail.      Hyperlipidemia: Previously started on atorvastatin however he tells me today that he is never started this medication.  LDL cholesterol checked today and it is higher than the target range.  Because of his age 10-year cardiovascular risk low.  Discussed lifestyle changes for now.    Hypertension-stage I hypertension  Is not clear if this is whitecoat hypertension or persistent hypertension.  Will benefit from 24-hour ambulatory blood pressure.  In the meantime discussed DASH diet, regular exercise, weight loss, and detailed instructions documented below provided to the patient.      Patient Instructions   Owen     Lantus dose to 35 units daily.     Continue with mealtime insulin (HUMOLOG)  of 1 unit for every 7 grams of carbohydrates with meals and snacks.      Please follow this correction scale with meals three times per day and at bedtime USING HUMOLOG insulin.   For  to 174 give 1 units.  For  to 209 give 2 units.  For  to 244 give 3 units.  For  to 279 give 4 units.  For  to 314 give 5 units.  For  to 359 give 6 units.  For BG >/= to 360 give 7 units.    [facility]-Department of Endocrinology  Clarissa Francis RN, Diabetes Educator: [phone]  Clinic Nurses HARRISON Michel; CMA's: Jane [phone]  SPENCER Andrade : [phone]  Clinic Fax: [phone]  On-Call Endocrine at the [place] (after  hours/weekends): [phone] option 4  Scheduling Line: [phone]          I will contact the patient with the test results.  Return to clinic in 3 months.  40 minutes spent on the date of the encounter doing chart review, history and exam, documentation and further activities per the note.   Dipak Montemayor MD  Staff Endocrinologist    Division of Endocrinology and Diabetes        Again, thank you for allowing me to participate in the care of your patient.        Sincerely,        Dipak Montemayor MD

## 2021-04-26 NOTE — NURSING NOTE
wOen Solomon's goals for this visit include:   Chief Complaint   Patient presents with     RECHECK     Type 1 diabetes mellitus without complication        He requests these members of his care team be copied on today's visit information:     PCP: Liberty King    Referring Provider:  No referring provider defined for this encounter.    /87 (BP Location: Left arm, Patient Position: Sitting, Cuff Size: Adult Large)   Pulse 102   Wt 125.3 kg (276 lb 3.2 oz)   SpO2 97%   BMI 36.37 kg/m      Do you need any medication refills at today's visit?  Frances Gil, [facility]

## 2021-04-26 NOTE — PROGRESS NOTES
Endocrinology Clinic Visit    Chief Complaint: RECHECK (Type 1 diabetes mellitus without complication )     Information obtained from:Patient    Subjective:         HPI: Owen Solomon is a 25 year old male with history of type 1 diabetes who is here for routine follow up.   He was diagnosed with type 1 diabetes after presenting with DKA in December 2018.  At that time he has had also 40-50 pounds weight loss.     Current regimen    Lantus dose to 40 units daily.     Continue with mealtime insulin (HUMOLOG)  of 1 unit for every 7 grams of carbohydrates with meals and snacks. (doesn't take lunch time coverage most of the time)    Correction scale 1:35    Misses lunch time meal bolus most of the time.               He has hypoglycemia awareness.  He reports consistently missing lunchtime mealtime bolus.    He eats 3 meals per day.  He works in [employer] field and his schedule varies from day-to-day.  Typically he wakes up between 4:30 AM-8:30 AM in the morning.  Lunch is afternoon.  Dinner is between 5-6 PM in the evening.  He does not snack after dinner most of the time.  He has gained back of his weight he has lost prior to his diagnosis of type 1 diabetes.  He has gained about 20+ pounds since I saw him last and he attributes that due to working from home and snacking more.  Wt Readings from Last 10 Encounters:   04/26/21 125.3 kg (276 lb 3.2 oz)   09/28/20 122.4 kg (269 lb 14.4 oz)   03/09/20 116.1 kg (256 lb)   12/09/19 109.5 kg (241 lb 6.5 oz)       Chronic complications of diabetes  Eye examination up-to-date-no diabetic retinopathy  No microalbuminuria last eye exam was December 2019  Blood pressure s  Microfilament examination completed today.  No peripheral neuropathy  Non-smoker     No Known Allergies    Current Outpatient Medications   Medication Sig Dispense Refill     Continuous Blood Gluc  (FREESTYLE PAUL 14 DAY READER) ADELAIDE 1 Device 5 times daily Use to monitor blood sugars per manufacture  instructions 1 each 0     Continuous Blood Gluc Sensor (FREESTYLE PAUL 14 DAY SENSOR) MISC USE AS DIRECTED 6 TIMES DAILY. CHANGE EVERY 14 DAYS AS DIRECTED 6 each 3     Glucagon 3 MG/DOSE POWD Spray 1 each in nostril as needed 1 each 0     insulin lispro (HUMALOG KWIKPEN) 100 UNIT/ML (1 unit dial) KWIKPEN 1 unit per 7 grams at breakfast, lunch, dinner and snacks. Uses up to 50  Units daily. 60 mL 1     insulin pen needle (32G X 4 MM) 32G X 4 MM miscellaneous Use 5 pen needles daily or as directed. 450 each 1     KETOSTIX test strip by TABLE SOLN route as needed 25 each 0     LANTUS SOLOSTAR 100 UNIT/ML soln Inject 35 Units Subcutaneous daily 45 mL 1     Multiple Vitamins-Minerals (SUPER THERA CHANO M) TABS Take 1 tablet by mouth daily       cetirizine (ZYRTEC) 10 MG tablet Take 10 mg by mouth daily       dulaglutide (TRULICITY) 1.5 MG/0.5ML pen Inject 0.75 mg Subcutaneous every 7 days (Patient not taking: Reported on 4/26/2021) 6 mL 1       Review of Systems     8 point review system (Constitutional, HENT, Eyes, Respiratory, Cardiovascular, Gastrointestinal, Genitourinary, Musculoskeletal,Neurological, Psychiatric/Behavioural, Endocrine) is negative or is as per HPI above    Past Medical History:   Diagnosis Date     Type 1 diabetes (H)        History reviewed. No pertinent surgical history.    Family History   Problem Relation Age of Onset     Thyroid Disease Mother      Diabetes Type 1 Brother      Heart Disease Maternal Grandfather          Objective:   /87 (BP Location: Left arm, Patient Position: Sitting, Cuff Size: Adult Large)   Pulse 102   Wt 125.3 kg (276 lb 3.2 oz)   SpO2 97%   BMI 36.37 kg/m    Constitutional: Pleasant no acute cardiopulmonary distress.   EYES: anicteric, normal extra-ocular movements, no lid lag or retraction, is equal and reactive to light bilaterally.  HEENT: Thyroid examination: Unremarkable  Cardiovascular: RRR, S1, S2 normal.   Pulmonary/Chest: CTAB. No wheezing or rales.    Abdominal: +BS. Non tender to palpation.  Stretch marks: Few, narrow stretch marks involving the lateral side of the abdomen  Neurological: Alert and oriented.  No tremor and reflexes are symmetrical bilaterally and within the normal limits. Muscle strength 5/5.   Extremities: No edema.  Sensory exam of the foot is normal, tested with the monofilament. Good pulses, no lesions or ulcers, good peripheral pulses.)  Psychological: appropriate mood and affect   In House Labs:       Hemoglobin A1C   Date Value Ref Range Status   04/26/2021 8.4 (H) 0 - 5.6 % Final     Comment:     Results confirmed by repeat test  Normal <5.7% Prediabetes 5.7-6.4%  Diabetes 6.5% or higher - adopted from ADA   consensus guidelines.     09/28/2020 8.4 (A) 0 - 5.6 % Final   03/09/2020 7.8 (A) 0 - 5.6 % Final       Assessment/Treatment Plan:      Type 1 diabetes recently diagnosed December 2018 after presenting with DKA and 50 pound weight loss:    Checks BG at least 5 times per day.   Couldn't have GLP-1 agonist covered for weight loss.   Blood glucose currently uncontrolled and reviewing his data this is secondary to not taking mealtime bolus at lunchtime.  In fact Lantus dose seems generous and he has lows overnight and we are backing off on that.  Counseling provided to consistently take mealtime boluses.    Reduce Basaglar insulin 35 units daily  Carbohydrate coverage 1 unit for every 7 g of carbohydrate with meals and snacks.  Continue current correction scale of 1 unit for every 35 above 140.    He has glucagon for emergency use.  He is not interested to pursue insulin pump at this point in time.    Chronic complications of diabetes  Eye examination up-to-date-no diabetic retinopathy  No microalbuminuria-checked today.   last eye exam no retinopathy per report  Blood pressure target less than 130/80.  Slightly higher than target.  Would like to work on lifestyle modification rather than adding medication at this time.  Microfilament  examination completed today.  No peripheral neuropathy  Non-smoker  Anti-MARIBELL antibody above 250 with undetectable C-peptide level.    Prior blood work which were done at an outside facility including basic metabolic panel, LFTs, and cholesterol panels were reviewed in detail.      Hyperlipidemia: Previously started on atorvastatin however he tells me today that he is never started this medication.  LDL cholesterol checked today and it is higher than the target range.  Because of his age 10-year cardiovascular risk low.  Discussed lifestyle changes for now.    Hypertension-stage I hypertension  Is not clear if this is whitecoat hypertension or persistent hypertension.  Will benefit from 24-hour ambulatory blood pressure.  In the meantime discussed DASH diet, regular exercise, weight loss, and detailed instructions documented below provided to the patient.      Patient Instructions   Owen,     Lantus dose to 35 units daily.     Continue with mealtime insulin (HUMOLOG)  of 1 unit for every 7 grams of carbohydrates with meals and snacks.      Please follow this correction scale with meals three times per day and at bedtime USING HUMOLOG insulin.   For  to 174 give 1 units.  For  to 209 give 2 units.  For  to 244 give 3 units.  For  to 279 give 4 units.  For  to 314 give 5 units.  For  to 359 give 6 units.  For BG >/= to 360 give 7 units.    [facility]-Department of Endocrinology  Clarissa Francis RN, Diabetes Educator: [phone]  Clinic Nurses HARRISON Michel; CMA's: Jane [phone]  WILL AndradeN : [phone]  Clinic Fax: [phone]  On-Call Endocrine at the [place] (after hours/weekends): [phone] option 4  Scheduling Line: [phone]          I will contact the patient with the test results.  Return to clinic in 3 months.  40 minutes spent on the date of the encounter doing chart review, history and exam, documentation and further activities  per the note.   Dipak Montemayor MD  Staff Endocrinologist    Division of Endocrinology and Diabetes

## 2021-08-15 ENCOUNTER — HEALTH MAINTENANCE LETTER (OUTPATIENT)
Age: 26
End: 2021-08-15

## 2021-09-03 DIAGNOSIS — E10.9 TYPE 1 DIABETES MELLITUS WITHOUT COMPLICATION (H): ICD-10-CM

## 2021-09-03 RX ORDER — INSULIN LISPRO 100 [IU]/ML
INJECTION, SOLUTION INTRAVENOUS; SUBCUTANEOUS
Qty: 30 ML | Refills: 1 | Status: SHIPPED | OUTPATIENT
Start: 2021-09-03 | End: 2021-11-17

## 2021-09-03 NOTE — TELEPHONE ENCOUNTER
HUMALOG 100 UNIT/ML KWIKPEN      Last Written Prescription Date:  9/28/20  Last Fill Quantity: 60 ml,   # refills: 1  Last Office Visit : 4/26/21  Future Office visit:  9/27/21    Routing refill request to provider for review/approval because:  Insulin - refilled per clinic  Overdue for lab  Nothing more recent in care everywhere nor media  Lab Test 04/26/21  0733   A1C 8.4*

## 2021-10-10 ENCOUNTER — HEALTH MAINTENANCE LETTER (OUTPATIENT)
Age: 26
End: 2021-10-10

## 2021-11-17 DIAGNOSIS — E10.9 TYPE 1 DIABETES MELLITUS WITHOUT COMPLICATION (H): ICD-10-CM

## 2021-11-17 RX ORDER — INSULIN LISPRO 100 [IU]/ML
INJECTION, SOLUTION INTRAVENOUS; SUBCUTANEOUS
Qty: 15 ML | Refills: 0 | Status: SHIPPED | OUTPATIENT
Start: 2021-11-17 | End: 2021-11-19

## 2021-11-17 NOTE — CONFIDENTIAL NOTE
Patient is overdue for office visit. Refill completed, no additional refills. Writer will send to clinic coordinators to schedule follow-up with Dr. Montemayor.      Marilu Decker RN  Endocrine Care Coordinator  [facility]

## 2021-11-17 NOTE — TELEPHONE ENCOUNTER
11/17 Called and spoke to patient. He is currently scheduled for follow up.     Joycelyn gracia Procedure   Orthopedics, Podiatry, Sports Medicine, ENT/Eye Specialties  [facility]   [phone]

## 2021-11-19 RX ORDER — INSULIN LISPRO 100 [IU]/ML
INJECTION, SOLUTION INTRAVENOUS; SUBCUTANEOUS
Qty: 45 ML | Refills: 0 | Status: SHIPPED | OUTPATIENT
Start: 2021-11-19 | End: 2022-02-01

## 2021-11-19 NOTE — TELEPHONE ENCOUNTER
Message from the pharmacy states the patients insurance will only cover 90 days supply at a time. Please advise on refill for 90 days.    Jane Martin [facility]  Adult Endocrinology  [facility]

## 2021-11-19 NOTE — CONFIDENTIAL NOTE
Updated prescription sent.      Marilu Decker RN  Endocrine Care Coordinator  [facility]

## 2021-12-04 ENCOUNTER — HEALTH MAINTENANCE LETTER (OUTPATIENT)
Age: 26
End: 2021-12-04

## 2022-01-31 NOTE — PROGRESS NOTES
Owen is a 26 year old who is being evaluated via a billable video visit.      How would you like to obtain your AVS? [employer]  If the video visit is dropped, the invitation should be resent by: Text to cell phone: [phone]  Will anyone else be joining your video visit? No      Outcome for 02/01/22 8:30 AM :Patient assisted with creating account, linking, downloading  and BG data - CARMEN. He changes intermittently with the Carmen sensor and Meter. Meter BG data listed below. [employer] BG data emailed to provider.    Date Pre-B Post-B Pre-L Post-L Pre-D Post-D  HS/NOC   1/31/2022 150  187  166 210    1/30/2022 178   163 134  140   1/29/2022 162   130 156  144   1/28/2022 180  178  201  210   1/27/2022 180   240  220 144   1/26/2022 156  86  140  130   1/25/2022 189  136  180 around dinner unsure if before or after  120     Gracy Del Cid [facility]  Adult Endocrinology  [facility]    Outcome for 01/31/22 9:58 AM : [employer] message sent to the patient requesting blood sugar information. Sent instructions to link and download to [employer].    Jane Martin CMA  Adult Endocrinology  [facility]

## 2022-02-01 ENCOUNTER — VIRTUAL VISIT (OUTPATIENT)
Dept: ENDOCRINOLOGY | Facility: CLINIC | Age: 27
End: 2022-02-01
Payer: COMMERCIAL

## 2022-02-01 DIAGNOSIS — E10.9 TYPE 1 DIABETES MELLITUS WITHOUT COMPLICATION (H): Primary | ICD-10-CM

## 2022-02-01 DIAGNOSIS — E66.01 MORBID OBESITY (H): ICD-10-CM

## 2022-02-01 PROCEDURE — 99215 OFFICE O/P EST HI 40 MIN: CPT | Mod: 95 | Performed by: INTERNAL MEDICINE

## 2022-02-01 RX ORDER — INSULIN DEGLUDEC 100 U/ML
32 INJECTION, SOLUTION SUBCUTANEOUS DAILY
Qty: 30 ML | Refills: 1 | Status: SHIPPED | OUTPATIENT
Start: 2022-02-01 | End: 2023-06-15

## 2022-02-01 RX ORDER — INSULIN LISPRO 100 [IU]/ML
INJECTION, SOLUTION INTRAVENOUS; SUBCUTANEOUS
Qty: 45 ML | Refills: 1 | Status: SHIPPED | OUTPATIENT
Start: 2022-02-01 | End: 2022-11-02

## 2022-02-01 RX ORDER — FLASH GLUCOSE SENSOR
KIT MISCELLANEOUS
Qty: 6 EACH | Refills: 3 | Status: SHIPPED | OUTPATIENT
Start: 2022-02-01 | End: 2023-02-24

## 2022-02-01 RX ORDER — INSULIN GLARGINE 100 [IU]/ML
35 INJECTION, SOLUTION SUBCUTANEOUS DAILY
Qty: 45 ML | Refills: 1 | Status: CANCELLED | OUTPATIENT
Start: 2022-02-01

## 2022-02-01 NOTE — PROGRESS NOTES
Endocrinology Clinic Visit    Chief Complaint: Diabetes (follow up, refills)     Information obtained from:Patient    Subjective:         HPI: Owen Solomon is a 26 year old male with history of type 1 diabetes who is here for routine follow up.   He was diagnosed with type 1 diabetes after presenting with DKA in December 2018.  At that time he has had also 40-50 pounds weight loss.     Current regimen    Lantus dose to 35 units daily.     mealtime insulin (HUMOLOG)  of 1 unit for every 7 grams of carbohydrates with meals and snacks. (doesn't take lunch time coverage most of the time)    Correction scale 1:35  BG readings as follows.     Date Pre-B Post-B Pre-L Post-L Pre-D Post-D  HS/NOC   1/31/2022 150  187  166 210    1/30/2022 178   163 134  140   1/29/2022 162   130 156  144   1/28/2022 180  178  201  210   1/27/2022 180   240  220 144   1/26/2022 156  86  140  130   1/25/2022 189  136  180 around dinner unsure if before or after  120           BG rising higher in the morning. lantus dose at 9:00 am in the morning.   He has hypoglycemia awareness.   He eats 3 meals per day.  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  Typically he wakes up between 4:30 AM-8:30 AM in the morning.  Lunch is afternoon.  Dinner is between 5-6 PM in the evening.  He does not snack after dinner most of the time.  He has gained back of his weight he has lost prior to his diagnosis of type 1 diabetes.  He has gained about 20+ pounds since I saw him last and he attributes that due to working from home and snacking more.  Wt Readings from Last 10 Encounters:   04/26/21 125.3 kg (276 lb 3.2 oz)   09/28/20 122.4 kg (269 lb 14.4 oz)   03/09/20 116.1 kg (256 lb)   12/09/19 109.5 kg (241 lb 6.5 oz)     Chronic complications of diabetes  Eye examination up-to-date-no diabetic retinopathy  No microalbuminuria last eye exam was December 2019  Blood pressure s  Microfilament examination completed today.  No peripheral  neuropathy  Non-smoker     No Known Allergies    Current Outpatient Medications   Medication Sig Dispense Refill     cetirizine (ZYRTEC) 10 MG tablet Take 10 mg by mouth daily       Continuous Blood Gluc  (FREESTYLE PAUL 14 DAY READER) ADELAIDE 1 Device 5 times daily Use to monitor blood sugars per manufacture instructions 1 each 0     Continuous Blood Gluc Sensor (FREESTYLE PAUL 14 DAY SENSOR) MISC USE AS DIRECTED 6 TIMES DAILY. CHANGE EVERY 14 DAYS AS DIRECTED 6 each 3     Glucagon 3 MG/DOSE POWD Spray 1 each in nostril as needed 1 each 0     insulin lispro (HUMALOG KWIKPEN) 100 UNIT/ML (1 unit dial) KWIKPEN 1 UNIT PER 7 GRAMS AT BREAKFAST, LUNCH, DINNER AND SNACKS. USES UP TO 50 UNITS DAILY. 45 mL 0     insulin pen needle (32G X 4 MM) 32G X 4 MM miscellaneous Use 5 pen needles daily or as directed. 450 each 1     KETOSTIX test strip by TABLE SOLN route as needed 25 each 0     LANTUS SOLOSTAR 100 UNIT/ML soln Inject 35 Units Subcutaneous daily 45 mL 1     Multiple Vitamins-Minerals (SUPER THERA CHANO M) TABS Take 1 tablet by mouth daily       dulaglutide (TRULICITY) 1.5 MG/0.5ML pen Inject 0.75 mg Subcutaneous every 7 days (Patient not taking: Reported on 4/26/2021) 6 mL 1       Review of Systems     8 point review system (Constitutional, HENT, Eyes, Respiratory, Cardiovascular, Gastrointestinal, Genitourinary, Musculoskeletal,Neurological, Psychiatric/Behavioural, Endocrine) is negative or is as per HPI above    Past Medical History:   Diagnosis Date     Type 1 diabetes (H)        Family History   Problem Relation Age of Onset     Thyroid Disease Mother      Diabetes Type 1 Brother      Heart Disease Maternal Grandfather          Objective:   There were no vitals taken for this visit.  Constitutional: Pleasant no acute cardiopulmonary distress.   EYES: anicteric, normal extra-ocular movements, no lid lag or retraction  Neurological: Alert and oriented.    Psychological: appropriate mood and affect   In House  Labs:       Hemoglobin A1C POCT   Date Value Ref Range Status   04/26/2021 8.4 (H) 0 - 5.6 % Final     Comment:     Results confirmed by repeat test  Normal <5.7% Prediabetes 5.7-6.4%  Diabetes 6.5% or higher - adopted from ADA   consensus guidelines.     09/28/2020 8.4 (A) 0 - 5.6 % Final   03/09/2020 7.8 (A) 0 - 5.6 % Final       Assessment/Treatment Plan:      Type 1 diabetes diagnosed in December 2018 after presenting with DKA and 50 pound weight loss:    Checks BG at least 4-5 times per day.   Blood glucose currently high in the morning fasting after overnight lows likely due to lantus not lasting 24 hours (lantus injection at 9:00 am in the morning). Therefore, recommended BID dosing of lantus or change basal insulin to degludec.      Insulin Degludec 32 units daily. (until you  degludec insulin - use Lantus 16 units twice daily). Once you  degludec insulin - stop using lantus. Wait 24 hours after last lantus injection before starting degludec.       Continue with mealtime insulin (HUMOLOG)  of 1 unit for every 7 grams of carbohydrates with meals and snacks.      Please follow this correction scale with meals three times per day and at bedtime USING HUMOLOG insulin.   For  to 174 give 1 units.  For  to 209 give 2 units.  For  to 244 give 3 units.  For  to 279 give 4 units.  For  to 314 give 5 units.  For  to 359 give 6 units.  For BG >/= to 360 give 7 units.      He has glucagon for emergency use.  He is not interested to pursue insulin pump at this point in time.  Labs due ordered.     Chronic complications of diabetes  Eye examination up-to-date-no diabetic retinopathy  No microalbuminuria-checked today.  Last eye exam no retinopathy per report  Blood pressure target less than 130/80.    Microfilament examination completed today.  No peripheral neuropathy  Non-smoker  Anti-MARIBELL antibody above 250 with undetectable C-peptide level.      Hyperlipidemia: LDL  "cholesterol check with next labs.  Because of his age 10-year cardiovascular risk low.  Discussed lifestyle changes for now.    Hypertension-stage I hypertension  Is not clear if this is whitecoat hypertension or persistent hypertension.  Will benefit from 24-hour ambulatory blood pressure.  In the meantime discussed DASH diet, regular exercise, weight loss, and detailed instructions documented below provided to the patient.  To be followed up in upcoming office visit.     Obesity:   Estimated body mass index is 36.37 kg/m  as calculated from the following:    Height as of 3/9/20: 1.856 m (6' 1.07\").    Weight as of 4/26/21: 125.3 kg (276 lb 3.2 oz). GLP-1 agonist not covered.     Weight loss and exercise are recommended in the management of obesity.   Caloric restriction, portion control and physical activity are evidence based strategies for life style changes.     Consider using applications for smart phones such as [employer], [employer] Immune [employer], Path.To, LoseIt, Tap&Track, and RelaxM. To keep food journal and track your exercise.     Focus on wet volumetrics, meaning, eat more foods that are high in water and fiber such as fruits and vegetables in order to get that full feeling. These are also good for your overall health as well.    Progressively increase physical activity to 45 minutes, including combination of cardio & resistance training x 5 days weekly. Start at 10-15 minutes per day and progressively work towards this goal.         I will contact the patient with the test results.  Return to clinic in 3-4 months.      Video-Visit Details    Type of service:  Video Visit  Video Call with Assigned ProviderStart: 02/01/2022 09:02 am  Stop: 02/01/2022 09:18 am  Originating Location (pt. Location): Home  Platform used for Video Visit: [employer]  45 minutes spent on the date of the encounter doing chart review, history and exam, documentation and further activities per the note.       Dipak Montemayor MD  Staff " Endocrinologist    Division of Endocrinology and Diabetes

## 2022-02-01 NOTE — PATIENT INSTRUCTIONS
"Owen,     Type 1 diabetes  Insulin Degludec 32 units daily. (until you  degludec insulin - use Lantus 16 units twice daily). Once you  degludec insulin - stop using lantus. Wait 24 hours after last lantus injection before starting degludec.     Continue with mealtime insulin (HUMOLOG)  of 1 unit for every 7 grams of carbohydrates with meals and snacks.      Please follow this correction scale with meals three times per day and at bedtime USING HUMOLOG insulin.   For  to 174 give 1 units.  For  to 209 give 2 units.  For  to 244 give 3 units.  For  to 279 give 4 units.  For  to 314 give 5 units.  For  to 359 give 6 units.  For BG >/= to 360 give 7 units.      Obesity:   Estimated body mass index is 36.37 kg/m  as calculated from the following:    Height as of 3/9/20: 1.856 m (6' 1.07\").    Weight as of 4/26/21: 125.3 kg (276 lb 3.2 oz). GLP-1 agonist not covered.     Weight loss and exercise are recommended in the management of obesity.   Caloric restriction, portion control and physical activity are evidence based strategies for life style changes.     Consider using applications for smart phones such as Stitch, Perfecto Mobile, Siftit, LoseIt, Tap&Track, and RelaxM. To keep food journal and track your exercise.     Focus on wet volumetrics, meaning, eat more foods that are high in water and fiber such as fruits and vegetables in order to get that full feeling. These are also good for your overall health as well.    Progressively increase physical activity to 45 minutes, including combination of cardio & resistance training x 5 days weekly. Start at 10-15 minutes per day and progressively work towards this goal.     Schedule fasting blood work   Orders Placed This Encounter   Procedures     Lipid Profile     Albumin Random Urine Quantitative with Creat Ratio     TSH with free T4 reflex     Basic metabolic panel   Lab scheduling to schedule at any [place] " lab locations: [phone] select option 1    [facility]-Department of Endocrinology  Clarissa Francis RN, Diabetes Educator: [phone]  Clinic Nurses HARRISON Michel; CMA's: Jane [phone]  WILL AndradeN : [phone]  Clinic Fax: [phone]  On-Call Endocrine at the [facility] (after hours/weekends): [phone] option 4  Scheduling Line: [phone]

## 2022-02-01 NOTE — LETTER
2/1/2022         RE: Owen Solomon  [address] [address] [place] [id number]        Dear Colleague,    Thank you for referring your patient, Owen Solomon, to the [facility]. Please see a copy of my visit note below.    Owen is a 26 year old who is being evaluated via a billable video visit.      How would you like to obtain your AVS? Liquid Bronzehart  If the video visit is dropped, the invitation should be resent by: Text to cell phone: [phone]  Will anyone else be joining your video visit? No      Outcome for 02/01/22 8:30 AM :Patient assisted with creating account, linking, downloading  and BG data - CARMEN. He changes intermittently with the Carmen sensor and Meter. Meter BG data listed below. Libreview BG data emailed to provider.    Date Pre-B Post-B Pre-L Post-L Pre-D Post-D  HS/NOC   1/31/2022 150  187  166 210    1/30/2022 178   163 134  140   1/29/2022 162   130 156  144   1/28/2022 180  178  201  210   1/27/2022 180   240  220 144   1/26/2022 156  86  140  130   1/25/2022 189  136  180 around dinner unsure if before or after  120     Gracy Del Cid [facility]  Adult Endocrinology  [facility]    Outcome for 01/31/22 9:58 AM : Open Utility message sent to the patient requesting blood sugar information. Sent instructions to link and download to [employer].    Jane Martin, [facility]  [facility] Endocrinology  [facility]        Endocrinology Clinic Visit    Chief Complaint: Diabetes (follow up, refills)     Information obtained from:Patient    Subjective:         HPI: Owen Solomon is a 26 year old male with history of type 1 diabetes who is here for routine follow up.   He was diagnosed with type 1 diabetes after presenting with DKA in December 2018.  At that time he has had also 40-50 pounds weight loss.     Current regimen    Lantus dose to 35 units daily.     mealtime insulin (HUMOLOG)  of 1 unit for every 7 grams of carbohydrates with meals and  snacks. (doesn't take lunch time coverage most of the time)    Correction scale 1:35  BG readings as follows.     Date Pre-B Post-B Pre-L Post-L Pre-D Post-D  HS/NOC   1/31/2022 150  187  166 210    1/30/2022 178   163 134  140   1/29/2022 162   130 156  144   1/28/2022 180  178  201  210   1/27/2022 180   240  220 144   1/26/2022 156  86  140  130   1/25/2022 189  136  180 around dinner unsure if before or after  120           BG rising higher in the morning. lantus dose at 9:00 am in the morning.   He has hypoglycemia awareness.   He eats 3 meals per day.  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  Typically he wakes up between 4:30 AM-8:30 AM in the morning.  Lunch is afternoon.  Dinner is between 5-6 PM in the evening.  He does not snack after dinner most of the time.  He has gained back of his weight he has lost prior to his diagnosis of type 1 diabetes.  He has gained about 20+ pounds since I saw him last and he attributes that due to working from home and snacking more.  Wt Readings from Last 10 Encounters:   04/26/21 125.3 kg (276 lb 3.2 oz)   09/28/20 122.4 kg (269 lb 14.4 oz)   03/09/20 116.1 kg (256 lb)   12/09/19 109.5 kg (241 lb 6.5 oz)     Chronic complications of diabetes  Eye examination up-to-date-no diabetic retinopathy  No microalbuminuria last eye exam was December 2019  Blood pressure s  Microfilament examination completed today.  No peripheral neuropathy  Non-smoker     No Known Allergies    Current Outpatient Medications   Medication Sig Dispense Refill     cetirizine (ZYRTEC) 10 MG tablet Take 10 mg by mouth daily       Continuous Blood Gluc  (FREESTYLE PAUL 14 DAY READER) ADELAIDE 1 Device 5 times daily Use to monitor blood sugars per manufacture instructions 1 each 0     Continuous Blood Gluc Sensor (FREESTYLE PAUL 14 DAY SENSOR) [facility] USE AS DIRECTED 6 TIMES DAILY. CHANGE EVERY 14 DAYS AS DIRECTED 6 each 3     Glucagon 3 MG/DOSE POWD Spray 1 each in nostril as  needed 1 each 0     insulin lispro (HUMALOG KWIKPEN) 100 UNIT/ML (1 unit dial) KWIKPEN 1 UNIT PER 7 GRAMS AT BREAKFAST, LUNCH, DINNER AND SNACKS. USES UP TO 50 UNITS DAILY. 45 mL 0     insulin pen needle (32G X 4 MM) 32G X 4 MM miscellaneous Use 5 pen needles daily or as directed. 450 each 1     KETOSTIX test strip by TABLE SOLN route as needed 25 each 0     LANTUS SOLOSTAR 100 UNIT/ML soln Inject 35 Units Subcutaneous daily 45 mL 1     Multiple Vitamins-Minerals (SUPER THERA CHANO M) TABS Take 1 tablet by mouth daily       dulaglutide (TRULICITY) 1.5 MG/0.5ML pen Inject 0.75 mg Subcutaneous every 7 days (Patient not taking: Reported on 4/26/2021) 6 mL 1       Review of Systems     8 point review system (Constitutional, HENT, Eyes, Respiratory, Cardiovascular, Gastrointestinal, Genitourinary, Musculoskeletal,Neurological, Psychiatric/Behavioural, Endocrine) is negative or is as per HPI above    Past Medical History:   Diagnosis Date     Type 1 diabetes (H)        Family History   Problem Relation Age of Onset     Thyroid Disease Mother      Diabetes Type 1 Brother      Heart Disease Maternal Grandfather          Objective:   There were no vitals taken for this visit.  Constitutional: Pleasant no acute cardiopulmonary distress.   EYES: anicteric, normal extra-ocular movements, no lid lag or retraction  Neurological: Alert and oriented.    Psychological: appropriate mood and affect   In House Labs:       Hemoglobin A1C POCT   Date Value Ref Range Status   04/26/2021 8.4 (H) 0 - 5.6 % Final     Comment:     Results confirmed by repeat test  Normal <5.7% Prediabetes 5.7-6.4%  Diabetes 6.5% or higher - adopted from ADA   consensus guidelines.     09/28/2020 8.4 (A) 0 - 5.6 % Final   03/09/2020 7.8 (A) 0 - 5.6 % Final       Assessment/Treatment Plan:      Type 1 diabetes diagnosed in December 2018 after presenting with DKA and 50 pound weight loss:    Checks BG at least 4-5 times per day.   Blood glucose currently high in  the morning fasting after overnight lows likely due to lantus not lasting 24 hours (lantus injection at 9:00 am in the morning). Therefore, recommended BID dosing of lantus or change basal insulin to degludec.      Insulin Degludec 32 units daily. (until you  degludec insulin - use Lantus 16 units twice daily). Once you  degludec insulin - stop using lantus. Wait 24 hours after last lantus injection before starting degludec.       Continue with mealtime insulin (HUMOLOG)  of 1 unit for every 7 grams of carbohydrates with meals and snacks.      Please follow this correction scale with meals three times per day and at bedtime USING HUMOLOG insulin.   For  to 174 give 1 units.  For  to 209 give 2 units.  For  to 244 give 3 units.  For  to 279 give 4 units.  For  to 314 give 5 units.  For  to 359 give 6 units.  For BG >/= to 360 give 7 units.      He has glucagon for emergency use.  He is not interested to pursue insulin pump at this point in time.  Labs due ordered.     Chronic complications of diabetes  Eye examination up-to-date-no diabetic retinopathy  No microalbuminuria-checked today.  Last eye exam no retinopathy per report  Blood pressure target less than 130/80.    Microfilament examination completed today.  No peripheral neuropathy  Non-smoker  Anti-MARIBELL antibody above 250 with undetectable C-peptide level.      Hyperlipidemia: LDL cholesterol check with next labs.  Because of his age 10-year cardiovascular risk low.  Discussed lifestyle changes for now.    Hypertension-stage I hypertension  Is not clear if this is whitecoat hypertension or persistent hypertension.  Will benefit from 24-hour ambulatory blood pressure.  In the meantime discussed DASH diet, regular exercise, weight loss, and detailed instructions documented below provided to the patient.  To be followed up in upcoming office visit.     Obesity:   Estimated body mass index is 36.37 kg/m  as  "calculated from the following:    Height as of 3/9/20: 1.856 m (6' 1.07\").    Weight as of 4/26/21: 125.3 kg (276 lb 3.2 oz). GLP-1 agonist not covered.     Weight loss and exercise are recommended in the management of obesity.   Caloric restriction, portion control and physical activity are evidence based strategies for life style changes.     Consider using applications for smart phones such as Postmates, webtide, Gipis, LoseIt, Tap&Track, and RelaxM. To keep food journal and track your exercise.     Focus on wet volumetrics, meaning, eat more foods that are high in water and fiber such as fruits and vegetables in order to get that full feeling. These are also good for your overall health as well.    Progressively increase physical activity to 45 minutes, including combination of cardio & resistance training x 5 days weekly. Start at 10-15 minutes per day and progressively work towards this goal.         I will contact the patient with the test results.  Return to clinic in 3-4 months.      Video-Visit Details    Type of service:  Video Visit  Video Call with Assigned ProviderStart: 02/01/2022 09:02 am  Stop: 02/01/2022 09:18 am  Originating Location (pt. Location): Home  Platform used for Video Visit: [employer]  45 minutes spent on the date of the encounter doing chart review, history and exam, documentation and further activities per the note.       Dipak Montemayor MD  Staff Endocrinologist    Division of Endocrinology and Diabetes      Again, thank you for allowing me to participate in the care of your patient.        Sincerely,        Dipak Montemayor MD    "

## 2022-03-08 ENCOUNTER — TRANSFERRED RECORDS (OUTPATIENT)
Dept: HEALTH INFORMATION MANAGEMENT | Facility: CLINIC | Age: 27
End: 2022-03-08
Payer: COMMERCIAL

## 2022-03-08 LAB — RETINOPATHY: NEGATIVE

## 2022-03-26 ENCOUNTER — HEALTH MAINTENANCE LETTER (OUTPATIENT)
Age: 27
End: 2022-03-26

## 2022-05-21 ENCOUNTER — HEALTH MAINTENANCE LETTER (OUTPATIENT)
Age: 27
End: 2022-05-21

## 2022-06-08 ENCOUNTER — TELEPHONE (OUTPATIENT)
Dept: ENDOCRINOLOGY | Facility: CLINIC | Age: 27
End: 2022-06-08

## 2022-06-08 NOTE — TELEPHONE ENCOUNTER
Patient overdue for follow up with fasting labs prior. Writer left message for patient to return call for appointment scheduling.    Gracy Del Cid [facility]  Adult Endocrinology  [facility]

## 2022-07-16 ENCOUNTER — HEALTH MAINTENANCE LETTER (OUTPATIENT)
Age: 27
End: 2022-07-16

## 2022-09-18 ENCOUNTER — HEALTH MAINTENANCE LETTER (OUTPATIENT)
Age: 27
End: 2022-09-18

## 2022-11-17 ENCOUNTER — MYC MEDICAL ADVICE (OUTPATIENT)
Dept: ENDOCRINOLOGY | Facility: CLINIC | Age: 27
End: 2022-11-17

## 2023-01-01 ENCOUNTER — HEALTH MAINTENANCE LETTER (OUTPATIENT)
Age: 28
End: 2023-01-01

## 2023-01-01 ENCOUNTER — OFFICE VISIT (OUTPATIENT)
Dept: ENDOCRINOLOGY | Facility: CLINIC | Age: 28
End: 2023-01-01
Payer: COMMERCIAL

## 2023-01-01 VITALS
OXYGEN SATURATION: 100 % | BODY MASS INDEX: 36.83 KG/M2 | DIASTOLIC BLOOD PRESSURE: 123 MMHG | SYSTOLIC BLOOD PRESSURE: 157 MMHG | HEART RATE: 115 BPM | WEIGHT: 243 LBS

## 2023-01-01 DIAGNOSIS — E78.5 HYPERLIPIDEMIA LDL GOAL <100: ICD-10-CM

## 2023-01-01 DIAGNOSIS — I10 BENIGN ESSENTIAL HYPERTENSION: ICD-10-CM

## 2023-01-01 DIAGNOSIS — E10.9 TYPE 1 DIABETES MELLITUS WITHOUT COMPLICATION (H): ICD-10-CM

## 2023-01-01 DIAGNOSIS — Z23 NEED FOR PROPHYLACTIC VACCINATION AND INOCULATION AGAINST INFLUENZA: Primary | ICD-10-CM

## 2023-01-01 LAB
ANION GAP SERPL CALCULATED.3IONS-SCNC: 15 MMOL/L (ref 7–15)
BUN SERPL-MCNC: 11 MG/DL (ref 6–20)
CALCIUM SERPL-MCNC: 10 MG/DL (ref 8.6–10)
CHLORIDE SERPL-SCNC: 100 MMOL/L (ref 98–107)
CREAT SERPL-MCNC: 0.73 MG/DL (ref 0.67–1.17)
DEPRECATED HCO3 PLAS-SCNC: 24 MMOL/L (ref 22–29)
EGFRCR SERPLBLD CKD-EPI 2021: >90 ML/MIN/1.73M2
GLUCOSE SERPL-MCNC: 220 MG/DL (ref 70–99)
HBA1C MFR BLD: 7.5 % (ref 4.3–?)
POTASSIUM SERPL-SCNC: 4.7 MMOL/L (ref 3.4–5.3)
SODIUM SERPL-SCNC: 139 MMOL/L (ref 136–145)

## 2023-01-01 PROCEDURE — 90471 IMMUNIZATION ADMIN: CPT | Performed by: PHYSICIAN ASSISTANT

## 2023-01-01 PROCEDURE — 80048 BASIC METABOLIC PNL TOTAL CA: CPT | Performed by: PHYSICIAN ASSISTANT

## 2023-01-01 PROCEDURE — 90686 IIV4 VACC NO PRSV 0.5 ML IM: CPT | Performed by: PHYSICIAN ASSISTANT

## 2023-01-01 PROCEDURE — 83036 HEMOGLOBIN GLYCOSYLATED A1C: CPT | Performed by: PHYSICIAN ASSISTANT

## 2023-01-01 PROCEDURE — 99214 OFFICE O/P EST MOD 30 MIN: CPT | Mod: 25 | Performed by: PHYSICIAN ASSISTANT

## 2023-01-01 PROCEDURE — 36415 COLL VENOUS BLD VENIPUNCTURE: CPT | Performed by: PHYSICIAN ASSISTANT

## 2023-01-01 RX ORDER — LISINOPRIL 20 MG/1
20 TABLET ORAL DAILY
Qty: 90 TABLET | Refills: 1 | Status: SHIPPED | OUTPATIENT
Start: 2023-01-01 | End: 2024-01-01

## 2023-01-29 ENCOUNTER — HEALTH MAINTENANCE LETTER (OUTPATIENT)
Age: 28
End: 2023-01-29

## 2023-02-24 DIAGNOSIS — E10.9 TYPE 1 DIABETES MELLITUS WITHOUT COMPLICATION (H): Primary | ICD-10-CM

## 2023-02-24 RX ORDER — FLASH GLUCOSE SENSOR
KIT MISCELLANEOUS
Qty: 6 EACH | Refills: 1 | Status: SHIPPED | OUTPATIENT
Start: 2023-02-24

## 2023-02-24 NOTE — TELEPHONE ENCOUNTER
Refill approved until follow up appt 6/15 with Alisson Anguiano.    Lupe Cruz, RN, Diabetes Educator  Diabetes Education Department  [facility] [place]  [phone]

## 2023-03-14 ENCOUNTER — TRANSFERRED RECORDS (OUTPATIENT)
Dept: HEALTH INFORMATION MANAGEMENT | Facility: CLINIC | Age: 28
End: 2023-03-14
Payer: COMMERCIAL

## 2023-03-14 LAB — RETINOPATHY: NEGATIVE

## 2023-05-07 ENCOUNTER — HEALTH MAINTENANCE LETTER (OUTPATIENT)
Age: 28
End: 2023-05-07

## 2023-06-04 ENCOUNTER — HEALTH MAINTENANCE LETTER (OUTPATIENT)
Age: 28
End: 2023-06-04

## 2023-06-07 NOTE — PROGRESS NOTES
Outcome for 06/07/23 11:10 AM: Data uploaded on Carmen-needs to be printed  Jane Martin CMA  Adult Endocrinology  [facility], [place]

## 2023-06-15 ENCOUNTER — OFFICE VISIT (OUTPATIENT)
Dept: ENDOCRINOLOGY | Facility: CLINIC | Age: 28
End: 2023-06-15
Payer: COMMERCIAL

## 2023-06-15 VITALS
RESPIRATION RATE: 16 BRPM | DIASTOLIC BLOOD PRESSURE: 122 MMHG | SYSTOLIC BLOOD PRESSURE: 160 MMHG | WEIGHT: 243.7 LBS | HEART RATE: 111 BPM | HEIGHT: 68 IN | BODY MASS INDEX: 36.93 KG/M2 | OXYGEN SATURATION: 99 %

## 2023-06-15 DIAGNOSIS — I10 BENIGN ESSENTIAL HYPERTENSION: ICD-10-CM

## 2023-06-15 DIAGNOSIS — E10.9 TYPE 1 DIABETES MELLITUS WITHOUT COMPLICATION (H): Primary | ICD-10-CM

## 2023-06-15 DIAGNOSIS — E78.5 HYPERLIPIDEMIA LDL GOAL <100: ICD-10-CM

## 2023-06-15 LAB
ANION GAP SERPL CALCULATED.3IONS-SCNC: 16 MMOL/L (ref 7–15)
BUN SERPL-MCNC: 6.6 MG/DL (ref 6–20)
CALCIUM SERPL-MCNC: 9.9 MG/DL (ref 8.6–10)
CHLORIDE SERPL-SCNC: 99 MMOL/L (ref 98–107)
CHOLEST SERPL-MCNC: 295 MG/DL
CREAT SERPL-MCNC: 0.82 MG/DL (ref 0.67–1.17)
CREAT UR-MCNC: 47.4 MG/DL
DEPRECATED HCO3 PLAS-SCNC: 23 MMOL/L (ref 22–29)
GFR SERPL CREATININE-BSD FRML MDRD: >90 ML/MIN/1.73M2
GLUCOSE SERPL-MCNC: 236 MG/DL (ref 70–99)
HBA1C MFR BLD: 8.7 % (ref 4.3–?)
HDLC SERPL-MCNC: 80 MG/DL
LDLC SERPL CALC-MCNC: 159 MG/DL
MICROALBUMIN UR-MCNC: <12 MG/L
MICROALBUMIN/CREAT UR: NORMAL MG/G{CREAT}
NONHDLC SERPL-MCNC: 215 MG/DL
POTASSIUM SERPL-SCNC: 4.6 MMOL/L (ref 3.4–5.3)
SODIUM SERPL-SCNC: 138 MMOL/L (ref 136–145)
TRIGL SERPL-MCNC: 281 MG/DL
TSH SERPL DL<=0.005 MIU/L-ACNC: 2.14 UIU/ML (ref 0.3–4.2)

## 2023-06-15 PROCEDURE — 99214 OFFICE O/P EST MOD 30 MIN: CPT | Performed by: PHYSICIAN ASSISTANT

## 2023-06-15 PROCEDURE — 36415 COLL VENOUS BLD VENIPUNCTURE: CPT | Performed by: PHYSICIAN ASSISTANT

## 2023-06-15 PROCEDURE — 83036 HEMOGLOBIN GLYCOSYLATED A1C: CPT | Performed by: PHYSICIAN ASSISTANT

## 2023-06-15 PROCEDURE — 80048 BASIC METABOLIC PNL TOTAL CA: CPT | Performed by: PHYSICIAN ASSISTANT

## 2023-06-15 PROCEDURE — 80061 LIPID PANEL: CPT | Performed by: PHYSICIAN ASSISTANT

## 2023-06-15 PROCEDURE — 84443 ASSAY THYROID STIM HORMONE: CPT | Performed by: PHYSICIAN ASSISTANT

## 2023-06-15 PROCEDURE — 82043 UR ALBUMIN QUANTITATIVE: CPT | Performed by: PHYSICIAN ASSISTANT

## 2023-06-15 PROCEDURE — 82570 ASSAY OF URINE CREATININE: CPT | Performed by: PHYSICIAN ASSISTANT

## 2023-06-15 RX ORDER — BLOOD-GLUCOSE SENSOR
1 EACH MISCELLANEOUS
Qty: 6 EACH | Refills: 1 | Status: SHIPPED | OUTPATIENT
Start: 2023-06-15

## 2023-06-15 RX ORDER — INSULIN DEGLUDEC 100 U/ML
36 INJECTION, SOLUTION SUBCUTANEOUS DAILY
Qty: 45 ML | Refills: 1 | Status: SHIPPED | OUTPATIENT
Start: 2023-06-15 | End: 2024-01-01

## 2023-06-15 RX ORDER — INSULIN LISPRO 100 [IU]/ML
INJECTION, SOLUTION INTRAVENOUS; SUBCUTANEOUS
Qty: 60 ML | Refills: 1 | Status: SHIPPED | OUTPATIENT
Start: 2023-06-15 | End: 2024-01-01

## 2023-06-15 ASSESSMENT — PAIN SCALES - GENERAL: PAINLEVEL: NO PAIN (0)

## 2023-06-15 NOTE — LETTER
6/15/2023         RE: Owen Solomon  [address] [address] [place] [id number]        Dear Colleague,    Thank you for referring your patient, Owen Solomon, to the [facility]. Please see a copy of my visit note below.    Outcome for 06/07/23 11:10 AM: Data uploaded on Carmen-needs to be printed  Jane Martin CMA  Adult Endocrinology  [facility], [place]      Assessment/Plan :   1. Type 1 DM. We discussed Owen's CGMS report at length. His numbers fluctuate from 60 mg/dl up to 350 mg/dl throughout the day. He is taking Tresiba 16 unit(s) twice daily but he feels like his blood sugars are still too elevated in the morning. He tries to correct for the hyperglycemia but that leads to a low. We discussed options for adjusting his insulin and I would like him to increase the Tresiba to 18 unit(s) twice daily. If his morning blood sugar remains elevated, he is to contact our office. I would like him to try and avoid using Humalog for correction when he is not eating.     We also discussed insulin pump therapy. He is not sure that he is ready to switch to a pump but he wanted more information today. He will contact our office, if he decides he would like to switch.    2. Hypertension. His blood pressure is elevated today. He is also overdue for a repeat urinary albumin. He thinks that he may have taken a blood pressure medication in the past but I can't find any record. If he has any evidence of urinary albumin, we will start him on losartan once daily. I will contact him with the results.    We will plan on a follow-up appt in 3 mos.      I have independently reviewed and interpreted labs, imaging as indicated.      Chief complaint:  Owen is a 27 year old male who returns for follow-up of Type 1 DM.    I have reviewed Care Everywhere including [facility], [facility], [facility], [facility], [place], [facility], [facility] , [facility], [place] lab reports, imaging reports  and provider notes as indicated.      HISTORY OF PRESENT ILLNESS  Owen was diagnosed with diabetes about 5 yrs ago. He has been managing his diabetes with MDI insulin therapy since diagnosis. He continues to struggle with large glucose fluctuations throughout the day. He feels like his blood sugars are always really elevated in the morning, he will try and use Humalog to correct for the hyperglycemia but that will lead to a low blood sugar. He tries not to overeat when experiencing hypoglycemia but he almost always does and so his blood sugar spikes back up. He currently takes Tresiba 16 unit(s) twice daily along with Humalog before meals based on a sliding scale of 1 unit(s) per 7 grams of carbs. He is currently monitoring his blood sugars with the FreeStyle 14 day sensor.    He has not had any problems with worsening vision. He recently had his yearly eye exam and he had no signs of diabetic retinopathy. He also denies any problems with numbness and/or tingling in his feet. He is due for screening urinary albumin. He has no history of microalbuminuria but he remembers taking a blood pressure medication at some point. His blood pressure is elevated today.     Endocrine relevant labs are as follows:   Latest Reference Range & Units 06/15/23 08:11   Hemoglobin A1C POCT 4.3 - <5.7 % 8.7 !   !: Data is abnormal   Latest Reference Range & Units 04/26/21 07:34   Albumin Urine mg/g Cr 0 - 17 mg/g Cr 6.58      Latest Reference Range & Units 04/26/21 07:34   Albumin Urine mg/L mg/L 6      Latest Reference Range & Units 03/09/20 09:07   C-Peptide 0.9 - 6.9 ng/mL 0.5 (L)   (L): Data is abnormally low     Latest Reference Range & Units 03/09/20 09:07   Glutamic Acid Decarboxylase Antibody 0.0 - 5.0 IU/mL >250.0 (H)   (H): Data is abnormally high    REVIEW OF SYSTEMS    Endocrine: positive for diabetes  Skin: negative  Eyes: positive for redness, tearing, itching, negative for, visual blurring  Ears/Nose/Throat:  negative  Respiratory: No shortness of breath, dyspnea on exertion, cough, or hemoptysis  Cardiovascular: negative for, chest pain and exercise intolerance  Gastrointestinal: negative for, nausea, vomiting, constipation and diarrhea  Genitourinary: negative for, nocturia, dysuria, frequency and urgency  Musculoskeletal: negative for, muscular weakness and nocturnal cramping  Neurologic: negative for and numbness or tingling of feet  Psychiatric: negative  Hematologic/Lymphatic/Immunologic: negative    Past Medical History  Past Medical History:   Diagnosis Date     Type 1 diabetes (H)        Medications  Current Outpatient Medications   Medication Sig Dispense Refill     cetirizine (ZYRTEC) 10 MG tablet Take 10 mg by mouth daily       Continuous Blood Gluc  (FREESTYLE PAUL 14 DAY READER) ADELAIDE 1 Device 5 times daily Use to monitor blood sugars per manufacture instructions 1 each 0     Continuous Blood Gluc Sensor (FREESTYLE PAUL 14 DAY SENSOR) MISC USE AS DIRECTED 6 TIMES DAILY. CHANGE EVERY 14 DAYS AS DIRECTED 6 each 1     Glucagon 3 MG/DOSE POWD Spray 1 each in nostril as needed 1 each 0     insulin degludec (TRESIBA FLEXTOUCH) 100 UNIT/ML pen Inject 32 Units Subcutaneous daily 30 mL 1     insulin lispro (HUMALOG KWIKPEN) 100 UNIT/ML (1 unit dial) KWIKPEN 1 UNIT PER 7 GRAMS AT BREAKFAST, LUNCH, DINNER AND SNACKS. USES UP TO 50 UNITS DAILY 60 mL 1     insulin pen needle (32G X 4 MM) 32G X 4 MM miscellaneous Use 5 pen needles daily or as directed. 450 each 11     KETOSTIX test strip by TABLE SOLN route as needed 25 each 0     Multiple Vitamins-Minerals (SUPER THERA CHANO M) TABS Take 1 tablet by mouth daily         Allergies  No Known Allergies      Family History  family history includes Diabetes Type 1 in his brother; Heart Disease in his maternal grandfather; Thyroid Disease in his mother.    Social History  Social History     Tobacco Use     Smoking status: Never     Smokeless tobacco: Never   Substance  "Use Topics     Alcohol use: Not Currently       Physical Exam  BP (!) 142/95 (BP Location: Left arm, Patient Position: Sitting, Cuff Size: Adult Regular)   Pulse 111   Resp 16   Ht 1.73 m (5' 8.11\")   Wt 110.5 kg (243 lb 11.2 oz)   SpO2 99%   BMI 36.93 kg/m    Body mass index is 36.93 kg/m .  GENERAL :  In no apparent distress  SKIN: Normal color, normal temperature, texture.  No hirsutism, alopecia or purple striae.     EYES: PERRL, EOMI, No scleral icterus,  No proptosis, conjunctival redness, stare, retraction  NECK: No visible masses. No palpable adenopathy, or masses. No carotid bruits.   THYROID:  Normal, nontender, smooth / firm texture,  no nodules, no Bruit   RESP: Lungs clear to auscultation bilaterally  CARDIAC: Regular rate and rhythm, normal S1 S2, without murmurs, rubs or gallops    NEURO: awake, alert, responds appropriately to questions.  Cranial nerves intact.   Moves all extremities; Gait normal.  No tremor of the outstretched hand.   EXTREMITIES: No clubbing, cyanosis or edema.    DATA REVIEW  FreeStyle LibreView  Time in target range 37%  Very low 1%, Low 3%, High 27% and Very High 32%  Current Ave BG is 214 mg/dl        Again, thank you for allowing me to participate in the care of your patient.        Sincerely,        Alisson Anguiano PA-C    "

## 2023-06-15 NOTE — NURSING NOTE
"Owen Solomon's goals for this visit include: pump?  He requests these members of his care team be copied on today's visit information: YES    PCP: Liberty King    Referring Provider:  Referred Self, MD  No address on file    BP (!) 142/95 (BP Location: Left arm, Patient Position: Sitting, Cuff Size: Adult Regular)   Pulse 111   Resp 16   Ht 1.73 m (5' 8.11\")   Wt 110.5 kg (243 lb 11.2 oz)   SpO2 99%   BMI 36.93 kg/m      Do you need any medication refills at today's visit? NONE    Gaudencio Bellamy, EMT      "

## 2023-06-15 NOTE — PROGRESS NOTES
Assessment/Plan :   1. Type 1 DM. We discussed Owen's CGMS report at length. His numbers fluctuate from 60 mg/dl up to 350 mg/dl throughout the day. He is taking Tresiba 16 unit(s) twice daily but he feels like his blood sugars are still too elevated in the morning. He tries to correct for the hyperglycemia but that leads to a low. We discussed options for adjusting his insulin and I would like him to increase the Tresiba to 18 unit(s) twice daily. If his morning blood sugar remains elevated, he is to contact our office. I would like him to try and avoid using Humalog for correction when he is not eating.     We also discussed insulin pump therapy. He is not sure that he is ready to switch to a pump but he wanted more information today. He will contact our office, if he decides he would like to switch.    2. Hypertension. His blood pressure is elevated today. He is also overdue for a repeat urinary albumin. He thinks that he may have taken a blood pressure medication in the past but I can't find any record. If he has any evidence of urinary albumin, we will start him on losartan once daily. I will contact him with the results.    We will plan on a follow-up appt in 3 mos.      I have independently reviewed and interpreted labs, imaging as indicated.      Chief complaint:  Owen is a 27 year old male who returns for follow-up of Type 1 DM.    I have reviewed Care Everywhere including [facility], [facility], [facility], [facility], [place], [facility], [facility] , [facility], [place] lab reports, imaging reports and provider notes as indicated.      HISTORY OF PRESENT ILLNESS  Owen was diagnosed with diabetes about 5 yrs ago. He has been managing his diabetes with MDI insulin therapy since diagnosis. He continues to struggle with large glucose fluctuations throughout the day. He feels like his blood sugars are always really elevated in the morning, he will try and use Humalog to correct for the  hyperglycemia but that will lead to a low blood sugar. He tries not to overeat when experiencing hypoglycemia but he almost always does and so his blood sugar spikes back up. He currently takes Tresiba 16 unit(s) twice daily along with Humalog before meals based on a sliding scale of 1 unit(s) per 7 grams of carbs. He is currently monitoring his blood sugars with the FreeStyle 14 day sensor.    He has not had any problems with worsening vision. He recently had his yearly eye exam and he had no signs of diabetic retinopathy. He also denies any problems with numbness and/or tingling in his feet. He is due for screening urinary albumin. He has no history of microalbuminuria but he remembers taking a blood pressure medication at some point. His blood pressure is elevated today.     Endocrine relevant labs are as follows:   Latest Reference Range & Units 06/15/23 08:11   Hemoglobin A1C POCT 4.3 - <5.7 % 8.7 !   !: Data is abnormal   Latest Reference Range & Units 04/26/21 07:34   Albumin Urine mg/g Cr 0 - 17 mg/g Cr 6.58      Latest Reference Range & Units 04/26/21 07:34   Albumin Urine mg/L mg/L 6      Latest Reference Range & Units 03/09/20 09:07   C-Peptide 0.9 - 6.9 ng/mL 0.5 (L)   (L): Data is abnormally low     Latest Reference Range & Units 03/09/20 09:07   Glutamic Acid Decarboxylase Antibody 0.0 - 5.0 IU/mL >250.0 (H)   (H): Data is abnormally high    REVIEW OF SYSTEMS    Endocrine: positive for diabetes  Skin: negative  Eyes: positive for redness, tearing, itching, negative for, visual blurring  Ears/Nose/Throat: negative  Respiratory: No shortness of breath, dyspnea on exertion, cough, or hemoptysis  Cardiovascular: negative for, chest pain and exercise intolerance  Gastrointestinal: negative for, nausea, vomiting, constipation and diarrhea  Genitourinary: negative for, nocturia, dysuria, frequency and urgency  Musculoskeletal: negative for, muscular weakness and nocturnal cramping  Neurologic: negative for  "and numbness or tingling of feet  Psychiatric: negative  Hematologic/Lymphatic/Immunologic: negative    Past Medical History  Past Medical History:   Diagnosis Date     Type 1 diabetes (H)        Medications  Current Outpatient Medications   Medication Sig Dispense Refill     cetirizine (ZYRTEC) 10 MG tablet Take 10 mg by mouth daily       Continuous Blood Gluc  (FREESTYLE PAUL 14 DAY READER) ADELAIDE 1 Device 5 times daily Use to monitor blood sugars per manufacture instructions 1 each 0     Continuous Blood Gluc Sensor (FREESTYLE PAUL 14 DAY SENSOR) MISC USE AS DIRECTED 6 TIMES DAILY. CHANGE EVERY 14 DAYS AS DIRECTED 6 each 1     Glucagon 3 MG/DOSE POWD Spray 1 each in nostril as needed 1 each 0     insulin degludec (TRESIBA FLEXTOUCH) 100 UNIT/ML pen Inject 32 Units Subcutaneous daily 30 mL 1     insulin lispro (HUMALOG KWIKPEN) 100 UNIT/ML (1 unit dial) KWIKPEN 1 UNIT PER 7 GRAMS AT BREAKFAST, LUNCH, DINNER AND SNACKS. USES UP TO 50 UNITS DAILY 60 mL 1     insulin pen needle (32G X 4 MM) 32G X 4 MM miscellaneous Use 5 pen needles daily or as directed. 450 each 11     KETOSTIX test strip by TABLE SOLN route as needed 25 each 0     Multiple Vitamins-Minerals (SUPER THERA CHANO M) TABS Take 1 tablet by mouth daily         Allergies  No Known Allergies      Family History  family history includes Diabetes Type 1 in his brother; Heart Disease in his maternal grandfather; Thyroid Disease in his mother.    Social History  Social History     Tobacco Use     Smoking status: Never     Smokeless tobacco: Never   Substance Use Topics     Alcohol use: Not Currently       Physical Exam  BP (!) 142/95 (BP Location: Left arm, Patient Position: Sitting, Cuff Size: Adult Regular)   Pulse 111   Resp 16   Ht 1.73 m (5' 8.11\")   Wt 110.5 kg (243 lb 11.2 oz)   SpO2 99%   BMI 36.93 kg/m    Body mass index is 36.93 kg/m .  GENERAL :  In no apparent distress  SKIN: Normal color, normal temperature, texture.  No hirsutism, " alopecia or purple striae.     EYES: PERRL, EOMI, No scleral icterus,  No proptosis, conjunctival redness, stare, retraction  NECK: No visible masses. No palpable adenopathy, or masses. No carotid bruits.   THYROID:  Normal, nontender, smooth / firm texture,  no nodules, no Bruit   RESP: Lungs clear to auscultation bilaterally  CARDIAC: Regular rate and rhythm, normal S1 S2, without murmurs, rubs or gallops    NEURO: awake, alert, responds appropriately to questions.  Cranial nerves intact.   Moves all extremities; Gait normal.  No tremor of the outstretched hand.   EXTREMITIES: No clubbing, cyanosis or edema.    DATA REVIEW  FreeStyle LibreView  Time in target range 37%  Very low 1%, Low 3%, High 27% and Very High 32%  Current Ave BG is 214 mg/dl

## 2023-06-20 ENCOUNTER — TELEPHONE (OUTPATIENT)
Dept: ENDOCRINOLOGY | Facility: CLINIC | Age: 28
End: 2023-06-20
Payer: COMMERCIAL

## 2023-06-20 DIAGNOSIS — E78.5 HYPERLIPIDEMIA LDL GOAL <100: Primary | ICD-10-CM

## 2023-06-20 DIAGNOSIS — E10.9 TYPE 1 DIABETES MELLITUS WITHOUT COMPLICATION (H): ICD-10-CM

## 2023-06-20 RX ORDER — LISINOPRIL 10 MG/1
10 TABLET ORAL DAILY
Qty: 90 TABLET | Refills: 0 | Status: ON HOLD | OUTPATIENT
Start: 2023-06-20 | End: 2024-01-01

## 2023-06-20 RX ORDER — ATORVASTATIN CALCIUM 20 MG/1
20 TABLET, FILM COATED ORAL DAILY
Qty: 90 TABLET | Refills: 0 | Status: SHIPPED | OUTPATIENT
Start: 2023-06-20

## 2023-06-20 NOTE — TELEPHONE ENCOUNTER
Left voicemail for patient to contact our office.       Mrailu Decker RN  Endocrine Care Coordinator  [facility]

## 2023-06-20 NOTE — TELEPHONE ENCOUNTER
----- Message from Alisson Anguiano PA-C sent at 6/20/2023  7:56 AM CDT -----  Owen,  There is no evidence of protein in your urine but I am still concerned about your blood pressure. Also, your cholesterol is very elevated. It looks like you took atorvastatin in the past for the cholesterol. I am going to send that back in to your pharmacy and I would like to start you on a low dose blood pressure medication. A new prescription for lisinopril 10 mg will be sent to you pharmacy. If you have any problems, please contact our office.  Thanks,  Alisson

## 2023-07-07 NOTE — TELEPHONE ENCOUNTER
Patient did not return call. Uncertain if patient read Davia message and/or picked up medication. Writer again attempted to contact patient. No answer. Left voicemail for patient to contact our office. Will also send follow-up Davia message.         Marilu Decker RN  Endocrine Care Coordinator  [facility]

## 2023-09-13 NOTE — PROGRESS NOTES
Outcome for 09/13/23 9:25 AM: Data uploaded on Carmen reports printed.  Jane Martin CMA  Adult Endocrinology  [facility], [place]

## 2023-09-15 NOTE — NURSING NOTE
Owen Solomon's goals for this visit include: NONE  He requests these members of his care team be copied on today's visit information: YES    PCP: Liberty King    Referring Provider:  No referring provider defined for this encounter.    BP (!) 157/123 (BP Location: Left arm, Patient Position: Sitting, Cuff Size: Adult Regular)   Pulse 115   Wt 110.2 kg (243 lb)   SpO2 100%   BMI 36.83 kg/m      Do you need any medication refills at today's visit? NONE    Gaudencio Bellamy, EMT

## 2023-09-15 NOTE — LETTER
9/15/2023         RE: Owen Solomon  [address] [address] [place] [id number]        Dear Colleague,    Thank you for referring your patient, Owen Solomon, to the [facility]. Please see a copy of my visit note below.    Outcome for 09/13/23 9:25 AM: Data uploaded on [email or web address] reports printed.  Jane Martin, [facility]  Adult Endocrinology  [facility], [place]      Assessment/Plan :   Type 1 DM. Owen has been working hard to get his blood sugars under better control. He feels like he is doing much better. We reviewed his recent CGMS report and his blood sugars are improving. We reviewed the importance of taking his pre-meal bolus before meals, and he will continue to work on timing. I encouraged him to keep up the good work and we will follow-up in 3 mos.  HTN. His blood pressure is still a little elevated today. He has not had any adverse reaction to the lisinopril. We discussed his current dosing and we will send in a new prescription for lisinopril 20 mg daily.   Hyperlipidemia. We reviewed his previous laboratory results and we discussed how diabetes affects his risk of heart attack and stroke. He seems to be tolerating the atorvastatin. We will continue with 20 mg. We will repeat a lipid panel in follow-up.      I have independently reviewed and interpreted labs, imaging as indicated.      Chief complaint:  Owen is a 28 year old male who returns for follow-up of Type 1 DM.    I have reviewed Care Everywhere including [facility], [facility], [facility], [facility], [facility] , , [place] lab reports, imaging reports and provider notes as indicated.      HISTORY OF PRESENT ILLNESS  Owen has been working hard to get better control over his blood sugars. He was also able to start both the lisinopril and atorvastatin after our last visit. He has not had any problems with the new medications. He continues to take Tresiba 36 unit(s) daily along  with Humalog before meals 1 unit(s) for every 7 grams of carbs. He is monitoring his blood sugars with the FreeStyle Carmen sensor.    He has not had any problems with severe hyperglycemia and/or hypoglycemia. He still gets the occasional post-prandial spike. This usually occurs when he miscalculates the amount of carbs in a meal. Overall, he feels like his current insulin to carb ratio is working well. He also has not had any problems with blurred vision or worsening numbness/tingling in his feet. Overall, he feels like things are starting to stabilize. He has had diabetes for 5 yrs and he feels like he is finally starting to get control over his blood sugars.     His diabetes is complicated by HTN and hyperlipidemia. He has been on and off medication in the past. After the last visit we sent in new prescriptions for both lisinopril and atorvastatin. He has not had any adverse effects from the medications.    Endocrine relevant labs are as follows:     Latest Reference Range & Units 09/15/23 09:06   Hemoglobin A1C POCT 4.3 - <5.7 % 7.5 !   !: Data is abnormal    REVIEW OF SYSTEMS    Endocrine: positive for diabetes  Skin: negative  Eyes: negative for, visual blurring, redness, tearing  Ears/Nose/Throat: negative  Respiratory: No shortness of breath, dyspnea on exertion, cough, or hemoptysis  Cardiovascular: negative for, chest pain, lower extremity edema, and exercise intolerance  Gastrointestinal: negative for, nausea, vomiting, constipation, and diarrhea  Genitourinary: negative for, nocturia, dysuria, frequency, and urgency  Musculoskeletal: negative for, muscular weakness, and nocturnal cramping  Neurologic: negative for, local weakness, and numbness or tingling of feet  Psychiatric: negative  Hematologic/Lymphatic/Immunologic: negative    Past Medical History  Past Medical History:   Diagnosis Date     Type 1 diabetes (H)        Medications  Current Outpatient Medications   Medication Sig Dispense Refill      atorvastatin (LIPITOR) 20 MG tablet Take 1 tablet (20 mg) by mouth daily 90 tablet 0     cetirizine (ZYRTEC) 10 MG tablet Take 10 mg by mouth daily       Continuous Blood Gluc  (FREESTYLE PAUL 14 DAY READER) ADELAIDE 1 Device 5 times daily Use to monitor blood sugars per manufacture instructions 1 each 0     Continuous Blood Gluc Sensor (FREESTYLE PAUL 14 DAY SENSOR) MISC USE AS DIRECTED 6 TIMES DAILY. CHANGE EVERY 14 DAYS AS DIRECTED 6 each 1     Continuous Blood Gluc Sensor (FREESTYLE PAUL 3 SENSOR) MISC 1 each every 14 days 6 each 1     Glucagon 3 MG/DOSE POWD Spray 1 each in nostril as needed 1 each 0     insulin degludec (TRESIBA FLEXTOUCH) 100 UNIT/ML pen Inject 36 Units Subcutaneous daily 45 mL 1     insulin lispro (HUMALOG KWIKPEN) 100 UNIT/ML (1 unit dial) KWIKPEN 1 UNIT PER 7 GRAMS AT BREAKFAST, LUNCH, DINNER AND SNACKS. USES UP TO 50 UNITS DAILY 60 mL 1     insulin pen needle (32G X 4 MM) 32G X 4 MM miscellaneous Use 5 pen needles daily or as directed. 450 each 11     KETOSTIX test strip by TABLE SOLN route as needed 25 each 0     lisinopril (ZESTRIL) 10 MG tablet Take 1 tablet (10 mg) by mouth daily 90 tablet 0     Multiple Vitamins-Minerals (SUPER THERA CHANO M) TABS Take 1 tablet by mouth daily         Allergies  No Known Allergies      Family History  family history includes Diabetes Type 1 in his brother; Heart Disease in his maternal grandfather; Thyroid Disease in his mother.    Social History  Social History     Tobacco Use     Smoking status: Never     Smokeless tobacco: Never   Substance Use Topics     Alcohol use: Not Currently       Physical Exam  BP (!) 157/123 (BP Location: Left arm, Patient Position: Sitting, Cuff Size: Adult Regular)   Pulse 115   Wt 110.2 kg (243 lb)   SpO2 100%   BMI 36.83 kg/m    Body mass index is 36.83 kg/m .  GENERAL :  In no apparent distress  SKIN: Normal color, normal temperature, texture.  No hirsutism, alopecia or purple striae.     EYES: PERRL, EOMI,  No scleral icterus,  No proptosis, conjunctival redness, stare, retraction  RESP: Lungs clear to auscultation bilaterally  CARDIAC: Regular rate and rhythm, normal S1 S2, without murmurs, rubs or gallops    NEURO: awake, alert, responds appropriately to questions.  Cranial nerves intact.   Moves all extremities; Gait normal.  No tremor of the outstretched hand.    EXTREMITIES: No clubbing, cyanosis or edema.  FOOT EXAM: No obvious signs of deformity or pre-ulcerative callus formation. Strong pedal pulses    DATA REVIEW  FreeStyle LibreView  Time in target range 54%  Low 2%  High 35% and Very High 9%  Current Ave  mg/dl        Again, thank you for allowing me to participate in the care of your patient.        Sincerely,        Alisson Anguiano PA-C

## 2023-09-15 NOTE — PATIENT INSTRUCTIONS
[facility]-Department of Endocrinology  Diabetes Educators:   Lupe Cruz, RN and Alisha Bucio RN  Clinic Nurse: HARRISON Michel  CMA's: Dawna Lara and Ashish   EMT: Gaudencio  Scheduling/Clinic phone number : [phone]   Clinic Fax: [phone]  On-Call Endocrine at the [facility] (after hours/weekends): [phone] option 4    Please call the number below to schedule your labs.  [facility] [phone]   [facility] [phone]   [place] [phone]   [facility]  [phone]   [facility] [phone]   [place] [phone]   [facility] [place]) [phone]   [facility] [place] Walk-In Only   [place] [phone]   [place] [phone]   [place] [phone]   [place] [phone]     Please reach out to the following centers to schedule your imaging appointment:  Imaging (DEXA, CT, MRI, XRAY)    [facility] ([facility], [facility] [place], [place]) [phone]   [facility] ([place], [place]) [phone]   [facility] ([facility]) [phone]   [facility] ([facility]) [phone]     Appointment Reminders:  * Please bring meter with for staff to download  * If you are due ONLY for an A1C, it is scheduled with the nurse and will be done in clinic. You do not need to schedule a lab appointment. Fasting is not required for an A1C.  * Refill request should be submitted to your pharmacy. They will contact clinic for approval.

## 2023-09-15 NOTE — PROGRESS NOTES
Assessment/Plan :   Type 1 DM. Owen has been working hard to get his blood sugars under better control. He feels like he is doing much better. We reviewed his recent CGMS report and his blood sugars are improving. We reviewed the importance of taking his pre-meal bolus before meals, and he will continue to work on timing. I encouraged him to keep up the good work and we will follow-up in 3 mos.  HTN. His blood pressure is still a little elevated today. He has not had any adverse reaction to the lisinopril. We discussed his current dosing and we will send in a new prescription for lisinopril 20 mg daily.   Hyperlipidemia. We reviewed his previous laboratory results and we discussed how diabetes affects his risk of heart attack and stroke. He seems to be tolerating the atorvastatin. We will continue with 20 mg. We will repeat a lipid panel in follow-up.      I have independently reviewed and interpreted labs, imaging as indicated.      Chief complaint:  Owen is a 28 year old male who returns for follow-up of Type 1 DM.    I have reviewed Care Everywhere including [facility], [facility], [facility], [facility] , [facility], [place] lab reports, imaging reports and provider notes as indicated.      HISTORY OF PRESENT ILLNESS  Owen has been working hard to get better control over his blood sugars. He was also able to start both the lisinopril and atorvastatin after our last visit. He has not had any problems with the new medications. He continues to take Tresiba 36 unit(s) daily along with Humalog before meals 1 unit(s) for every 7 grams of carbs. He is monitoring his blood sugars with the FreeStyle Carmen sensor.    He has not had any problems with severe hyperglycemia and/or hypoglycemia. He still gets the occasional post-prandial spike. This usually occurs when he miscalculates the amount of carbs in a meal. Overall, he feels like his current insulin to carb ratio is  working well. He also has not had any problems with blurred vision or worsening numbness/tingling in his feet. Overall, he feels like things are starting to stabilize. He has had diabetes for 5 yrs and he feels like he is finally starting to get control over his blood sugars.     His diabetes is complicated by HTN and hyperlipidemia. He has been on and off medication in the past. After the last visit we sent in new prescriptions for both lisinopril and atorvastatin. He has not had any adverse effects from the medications.    Endocrine relevant labs are as follows:     Latest Reference Range & Units 09/15/23 09:06   Hemoglobin A1C POCT 4.3 - <5.7 % 7.5 !   !: Data is abnormal    REVIEW OF SYSTEMS    Endocrine: positive for diabetes  Skin: negative  Eyes: negative for, visual blurring, redness, tearing  Ears/Nose/Throat: negative  Respiratory: No shortness of breath, dyspnea on exertion, cough, or hemoptysis  Cardiovascular: negative for, chest pain, lower extremity edema, and exercise intolerance  Gastrointestinal: negative for, nausea, vomiting, constipation, and diarrhea  Genitourinary: negative for, nocturia, dysuria, frequency, and urgency  Musculoskeletal: negative for, muscular weakness, and nocturnal cramping  Neurologic: negative for, local weakness, and numbness or tingling of feet  Psychiatric: negative  Hematologic/Lymphatic/Immunologic: negative    Past Medical History  Past Medical History:   Diagnosis Date    Type 1 diabetes (H)        Medications  Current Outpatient Medications   Medication Sig Dispense Refill    atorvastatin (LIPITOR) 20 MG tablet Take 1 tablet (20 mg) by mouth daily 90 tablet 0    cetirizine (ZYRTEC) 10 MG tablet Take 10 mg by mouth daily      Continuous Blood Gluc  (FREESTYLE PAUL 14 DAY READER) ADELAIDE 1 Device 5 times daily Use to monitor blood sugars per manufacture instructions 1 each 0    Continuous Blood Gluc Sensor (FREESTYLE PAUL 14 DAY SENSOR) [facility] USE AS DIRECTED 6  TIMES DAILY. CHANGE EVERY 14 DAYS AS DIRECTED 6 each 1    Continuous Blood Gluc Sensor (FREESTYLE PAUL 3 SENSOR) MISC 1 each every 14 days 6 each 1    Glucagon 3 MG/DOSE POWD Spray 1 each in nostril as needed 1 each 0    insulin degludec (TRESIBA FLEXTOUCH) 100 UNIT/ML pen Inject 36 Units Subcutaneous daily 45 mL 1    insulin lispro (HUMALOG KWIKPEN) 100 UNIT/ML (1 unit dial) KWIKPEN 1 UNIT PER 7 GRAMS AT BREAKFAST, LUNCH, DINNER AND SNACKS. USES UP TO 50 UNITS DAILY 60 mL 1    insulin pen needle (32G X 4 MM) 32G X 4 MM miscellaneous Use 5 pen needles daily or as directed. 450 each 11    KETOSTIX test strip by TABLE SOLN route as needed 25 each 0    lisinopril (ZESTRIL) 10 MG tablet Take 1 tablet (10 mg) by mouth daily 90 tablet 0    Multiple Vitamins-Minerals (SUPER THERA CHANO M) TABS Take 1 tablet by mouth daily         Allergies  No Known Allergies      Family History  family history includes Diabetes Type 1 in his brother; Heart Disease in his maternal grandfather; Thyroid Disease in his mother.    Social History  Social History     Tobacco Use    Smoking status: Never    Smokeless tobacco: Never   Substance Use Topics    Alcohol use: Not Currently       Physical Exam  BP (!) 157/123 (BP Location: Left arm, Patient Position: Sitting, Cuff Size: Adult Regular)   Pulse 115   Wt 110.2 kg (243 lb)   SpO2 100%   BMI 36.83 kg/m    Body mass index is 36.83 kg/m .  GENERAL :  In no apparent distress  SKIN: Normal color, normal temperature, texture.  No hirsutism, alopecia or purple striae.     EYES: PERRL, EOMI, No scleral icterus,  No proptosis, conjunctival redness, stare, retraction  RESP: Lungs clear to auscultation bilaterally  CARDIAC: Regular rate and rhythm, normal S1 S2, without murmurs, rubs or gallops    NEURO: awake, alert, responds appropriately to questions.  Cranial nerves intact.   Moves all extremities; Gait normal.  No tremor of the outstretched hand.    EXTREMITIES: No clubbing, cyanosis or  edema.  FOOT EXAM: No obvious signs of deformity or pre-ulcerative callus formation. Strong pedal pulses    DATA REVIEW  FreeStyle LibreView  Time in target range 54%  Low 2%  High 35% and Very High 9%  Current Ave  mg/dl

## 2024-01-01 ENCOUNTER — HOSPITAL ENCOUNTER (INPATIENT)
Facility: CLINIC | Age: 29
LOS: 1 days | Discharge: HOME | End: 2024-05-02
Attending: INTERNAL MEDICINE | Admitting: STUDENT IN AN ORGANIZED HEALTH CARE EDUCATION/TRAINING PROGRAM
Payer: COMMERCIAL

## 2024-01-01 ENCOUNTER — HOSPITAL ENCOUNTER (EMERGENCY)
Facility: CLINIC | Age: 29
Discharge: HOME | End: 2024-05-01
Attending: STUDENT IN AN ORGANIZED HEALTH CARE EDUCATION/TRAINING PROGRAM | Admitting: STUDENT IN AN ORGANIZED HEALTH CARE EDUCATION/TRAINING PROGRAM
Payer: COMMERCIAL

## 2024-01-01 ENCOUNTER — HEALTH MAINTENANCE LETTER (OUTPATIENT)
Age: 29
End: 2024-01-01

## 2024-01-01 VITALS
WEIGHT: 236.33 LBS | HEART RATE: 91 BPM | BODY MASS INDEX: 31.32 KG/M2 | OXYGEN SATURATION: 98 % | DIASTOLIC BLOOD PRESSURE: 96 MMHG | TEMPERATURE: 98.5 F | RESPIRATION RATE: 20 BRPM | SYSTOLIC BLOOD PRESSURE: 136 MMHG | HEIGHT: 73 IN

## 2024-01-01 VITALS
HEART RATE: 93 BPM | DIASTOLIC BLOOD PRESSURE: 70 MMHG | RESPIRATION RATE: 19 BRPM | SYSTOLIC BLOOD PRESSURE: 118 MMHG | TEMPERATURE: 97.9 F | OXYGEN SATURATION: 100 %

## 2024-01-01 DIAGNOSIS — E78.5 HYPERLIPIDEMIA LDL GOAL <100: ICD-10-CM

## 2024-01-01 DIAGNOSIS — E10.9 TYPE 1 DIABETES MELLITUS WITHOUT COMPLICATION (H): ICD-10-CM

## 2024-01-01 DIAGNOSIS — I10 BENIGN ESSENTIAL HYPERTENSION: ICD-10-CM

## 2024-01-01 DIAGNOSIS — N17.9 AKI (ACUTE KIDNEY INJURY) (H): ICD-10-CM

## 2024-01-01 DIAGNOSIS — E10.10 DIABETIC KETOACIDOSIS WITHOUT COMA ASSOCIATED WITH TYPE 1 DIABETES MELLITUS (H): ICD-10-CM

## 2024-01-01 LAB
ALBUMIN SERPL BCG-MCNC: 3.5 G/DL (ref 3.5–5.2)
ALBUMIN SERPL BCG-MCNC: 3.7 G/DL (ref 3.5–5.2)
ALBUMIN SERPL BCG-MCNC: 3.9 G/DL (ref 3.5–5.2)
ALBUMIN SERPL BCG-MCNC: 4.6 G/DL (ref 3.5–5.2)
ALBUMIN UR-MCNC: 30 MG/DL
ALP SERPL-CCNC: 106 U/L (ref 40–150)
ALP SERPL-CCNC: 134 U/L (ref 40–150)
ALP SERPL-CCNC: 169 U/L (ref 40–150)
ALT SERPL W P-5'-P-CCNC: 47 U/L (ref 0–70)
ALT SERPL W P-5'-P-CCNC: 64 U/L (ref 0–70)
ALT SERPL W P-5'-P-CCNC: 83 U/L (ref 0–70)
AMPHETAMINES UR QL SCN: NORMAL
ANION GAP SERPL CALCULATED.3IONS-SCNC: 10 MMOL/L (ref 7–15)
ANION GAP SERPL CALCULATED.3IONS-SCNC: 11 MMOL/L (ref 7–15)
ANION GAP SERPL CALCULATED.3IONS-SCNC: 14 MMOL/L (ref 7–15)
ANION GAP SERPL CALCULATED.3IONS-SCNC: 14 MMOL/L (ref 7–15)
ANION GAP SERPL CALCULATED.3IONS-SCNC: 26 MMOL/L (ref 7–15)
ANION GAP SERPL CALCULATED.3IONS-SCNC: 38 MMOL/L (ref 7–15)
APPEARANCE UR: CLEAR
AST SERPL W P-5'-P-CCNC: 24 U/L (ref 0–45)
AST SERPL W P-5'-P-CCNC: 26 U/L (ref 0–45)
AST SERPL W P-5'-P-CCNC: 38 U/L (ref 0–45)
B-OH-BUTYR SERPL-SCNC: 0.6 MMOL/L
B-OH-BUTYR SERPL-SCNC: 1.7 MMOL/L
B-OH-BUTYR SERPL-SCNC: 3.5 MMOL/L
B-OH-BUTYR SERPL-SCNC: 7 MMOL/L
B-OH-BUTYR SERPL-SCNC: >9 MMOL/L
B-OH-BUTYR SERPL-SCNC: >9 MMOL/L
BARBITURATES UR QL SCN: NORMAL
BASE EXCESS BLDV CALC-SCNC: -15.9 MMOL/L (ref -3–3)
BASE EXCESS BLDV CALC-SCNC: -22.8 MMOL/L (ref -3–3)
BASE EXCESS BLDV CALC-SCNC: -7.1 MMOL/L (ref -3–3)
BASOPHILS # BLD AUTO: 0.2 10E3/UL (ref 0–0.2)
BASOPHILS NFR BLD AUTO: 2 %
BENZODIAZ UR QL SCN: NORMAL
BILIRUB SERPL-MCNC: 0.2 MG/DL
BILIRUB SERPL-MCNC: 0.3 MG/DL
BILIRUB SERPL-MCNC: 0.4 MG/DL
BILIRUB UR QL STRIP: NEGATIVE
BUN SERPL-MCNC: 10.5 MG/DL (ref 6–20)
BUN SERPL-MCNC: 11.1 MG/DL (ref 6–20)
BUN SERPL-MCNC: 14.7 MG/DL (ref 6–20)
BUN SERPL-MCNC: 8.2 MG/DL (ref 6–20)
BUN SERPL-MCNC: 8.8 MG/DL (ref 6–20)
BUN SERPL-MCNC: 8.8 MG/DL (ref 6–20)
BUN SERPL-MCNC: 9.4 MG/DL (ref 6–20)
BUN SERPL-MCNC: 9.8 MG/DL (ref 6–20)
BZE UR QL SCN: NORMAL
CALCIUM SERPL-MCNC: 8 MG/DL (ref 8.6–10)
CALCIUM SERPL-MCNC: 8.1 MG/DL (ref 8.6–10)
CALCIUM SERPL-MCNC: 8.2 MG/DL (ref 8.6–10)
CALCIUM SERPL-MCNC: 8.4 MG/DL (ref 8.6–10)
CALCIUM SERPL-MCNC: 8.4 MG/DL (ref 8.6–10)
CALCIUM SERPL-MCNC: 8.5 MG/DL (ref 8.6–10)
CALCIUM SERPL-MCNC: 8.7 MG/DL (ref 8.6–10)
CALCIUM SERPL-MCNC: 9.6 MG/DL (ref 8.6–10)
CANNABINOIDS UR QL SCN: NORMAL
CHLORIDE SERPL-SCNC: 103 MMOL/L (ref 98–107)
CHLORIDE SERPL-SCNC: 103 MMOL/L (ref 98–107)
CHLORIDE SERPL-SCNC: 105 MMOL/L (ref 98–107)
CHLORIDE SERPL-SCNC: 105 MMOL/L (ref 98–107)
CHLORIDE SERPL-SCNC: 107 MMOL/L (ref 98–107)
CHLORIDE SERPL-SCNC: 109 MMOL/L (ref 98–107)
CHLORIDE SERPL-SCNC: 109 MMOL/L (ref 98–107)
CHLORIDE SERPL-SCNC: 86 MMOL/L (ref 98–107)
COLOR UR AUTO: ABNORMAL
CREAT SERPL-MCNC: 0.73 MG/DL (ref 0.67–1.17)
CREAT SERPL-MCNC: 0.73 MG/DL (ref 0.67–1.17)
CREAT SERPL-MCNC: 0.86 MG/DL (ref 0.67–1.17)
CREAT SERPL-MCNC: 0.9 MG/DL (ref 0.67–1.17)
CREAT SERPL-MCNC: 0.91 MG/DL (ref 0.67–1.17)
CREAT SERPL-MCNC: 0.96 MG/DL (ref 0.67–1.17)
CREAT SERPL-MCNC: 1 MG/DL (ref 0.67–1.17)
CREAT SERPL-MCNC: 1.23 MG/DL (ref 0.67–1.17)
DEPRECATED HCO3 PLAS-SCNC: 10 MMOL/L (ref 22–29)
DEPRECATED HCO3 PLAS-SCNC: 18 MMOL/L (ref 22–29)
DEPRECATED HCO3 PLAS-SCNC: 18 MMOL/L (ref 22–29)
DEPRECATED HCO3 PLAS-SCNC: 20 MMOL/L (ref 22–29)
DEPRECATED HCO3 PLAS-SCNC: 21 MMOL/L (ref 22–29)
DEPRECATED HCO3 PLAS-SCNC: 8 MMOL/L (ref 22–29)
EGFRCR SERPLBLD CKD-EPI 2021: 81 ML/MIN/1.73M2
EGFRCR SERPLBLD CKD-EPI 2021: >90 ML/MIN/1.73M2
EOSINOPHIL # BLD AUTO: 0.3 10E3/UL (ref 0–0.7)
EOSINOPHIL NFR BLD AUTO: 3 %
ERYTHROCYTE [DISTWIDTH] IN BLOOD BY AUTOMATED COUNT: 10.9 % (ref 10–15)
ERYTHROCYTE [DISTWIDTH] IN BLOOD BY AUTOMATED COUNT: 11.2 % (ref 10–15)
ETHANOL SERPL-MCNC: <0.01 G/DL
FENTANYL UR QL: NORMAL
GLUCOSE BLDC GLUCOMTR-MCNC: 122 MG/DL (ref 70–99)
GLUCOSE BLDC GLUCOMTR-MCNC: 123 MG/DL (ref 70–99)
GLUCOSE BLDC GLUCOMTR-MCNC: 132 MG/DL (ref 70–99)
GLUCOSE BLDC GLUCOMTR-MCNC: 133 MG/DL (ref 70–99)
GLUCOSE BLDC GLUCOMTR-MCNC: 136 MG/DL (ref 70–99)
GLUCOSE BLDC GLUCOMTR-MCNC: 139 MG/DL (ref 70–99)
GLUCOSE BLDC GLUCOMTR-MCNC: 139 MG/DL (ref 70–99)
GLUCOSE BLDC GLUCOMTR-MCNC: 144 MG/DL (ref 70–99)
GLUCOSE BLDC GLUCOMTR-MCNC: 153 MG/DL (ref 70–99)
GLUCOSE BLDC GLUCOMTR-MCNC: 160 MG/DL (ref 70–99)
GLUCOSE BLDC GLUCOMTR-MCNC: 163 MG/DL (ref 70–99)
GLUCOSE BLDC GLUCOMTR-MCNC: 164 MG/DL (ref 70–99)
GLUCOSE BLDC GLUCOMTR-MCNC: 164 MG/DL (ref 70–99)
GLUCOSE BLDC GLUCOMTR-MCNC: 169 MG/DL (ref 70–99)
GLUCOSE BLDC GLUCOMTR-MCNC: 170 MG/DL (ref 70–99)
GLUCOSE BLDC GLUCOMTR-MCNC: 175 MG/DL (ref 70–99)
GLUCOSE BLDC GLUCOMTR-MCNC: 175 MG/DL (ref 70–99)
GLUCOSE BLDC GLUCOMTR-MCNC: 177 MG/DL (ref 70–99)
GLUCOSE BLDC GLUCOMTR-MCNC: 184 MG/DL (ref 70–99)
GLUCOSE BLDC GLUCOMTR-MCNC: 184 MG/DL (ref 70–99)
GLUCOSE BLDC GLUCOMTR-MCNC: 185 MG/DL (ref 70–99)
GLUCOSE BLDC GLUCOMTR-MCNC: 187 MG/DL (ref 70–99)
GLUCOSE BLDC GLUCOMTR-MCNC: 188 MG/DL (ref 70–99)
GLUCOSE BLDC GLUCOMTR-MCNC: 192 MG/DL (ref 70–99)
GLUCOSE BLDC GLUCOMTR-MCNC: 199 MG/DL (ref 70–99)
GLUCOSE BLDC GLUCOMTR-MCNC: 213 MG/DL (ref 70–99)
GLUCOSE BLDC GLUCOMTR-MCNC: 249 MG/DL (ref 70–99)
GLUCOSE BLDC GLUCOMTR-MCNC: 262 MG/DL (ref 70–99)
GLUCOSE BLDC GLUCOMTR-MCNC: 277 MG/DL (ref 70–99)
GLUCOSE BLDC GLUCOMTR-MCNC: 304 MG/DL (ref 70–99)
GLUCOSE BLDC GLUCOMTR-MCNC: 317 MG/DL (ref 70–99)
GLUCOSE BLDC GLUCOMTR-MCNC: 341 MG/DL (ref 70–99)
GLUCOSE BLDC GLUCOMTR-MCNC: 393 MG/DL (ref 70–99)
GLUCOSE SERPL-MCNC: 119 MG/DL (ref 70–99)
GLUCOSE SERPL-MCNC: 119 MG/DL (ref 70–99)
GLUCOSE SERPL-MCNC: 124 MG/DL (ref 70–99)
GLUCOSE SERPL-MCNC: 139 MG/DL (ref 70–99)
GLUCOSE SERPL-MCNC: 178 MG/DL (ref 70–99)
GLUCOSE SERPL-MCNC: 179 MG/DL (ref 70–99)
GLUCOSE SERPL-MCNC: 317 MG/DL (ref 70–99)
GLUCOSE SERPL-MCNC: 496 MG/DL (ref 70–99)
GLUCOSE UR STRIP-MCNC: >499 MG/DL
HBA1C MFR BLD: 8.6 %
HCO3 BLDV-SCNC: 11 MMOL/L (ref 21–28)
HCO3 BLDV-SCNC: 19 MMOL/L (ref 21–28)
HCO3 BLDV-SCNC: 7 MMOL/L (ref 21–28)
HCT VFR BLD AUTO: 36.7 % (ref 40–53)
HCT VFR BLD AUTO: 47.2 % (ref 40–53)
HGB BLD-MCNC: 12.6 G/DL (ref 13.3–17.7)
HGB BLD-MCNC: 16 G/DL (ref 13.3–17.7)
HGB UR QL STRIP: ABNORMAL
HOLD SPECIMEN: NORMAL
HOLD SPECIMEN: NORMAL
IMM GRANULOCYTES # BLD: 0.1 10E3/UL
IMM GRANULOCYTES NFR BLD: 1 %
KETONES UR STRIP-MCNC: 80 MG/DL
LACTATE SERPL-SCNC: 1.2 MMOL/L (ref 0.7–2)
LACTATE SERPL-SCNC: 2.2 MMOL/L (ref 0.7–2)
LEUKOCYTE ESTERASE UR QL STRIP: NEGATIVE
LYMPHOCYTES # BLD AUTO: 2.7 10E3/UL (ref 0.8–5.3)
LYMPHOCYTES NFR BLD AUTO: 32 %
MAGNESIUM SERPL-MCNC: 2.2 MG/DL (ref 1.7–2.3)
MAGNESIUM SERPL-MCNC: 2.3 MG/DL (ref 1.7–2.3)
MCH RBC QN AUTO: 32 PG (ref 26.5–33)
MCH RBC QN AUTO: 32.3 PG (ref 26.5–33)
MCHC RBC AUTO-ENTMCNC: 33.9 G/DL (ref 31.5–36.5)
MCHC RBC AUTO-ENTMCNC: 34.3 G/DL (ref 31.5–36.5)
MCV RBC AUTO: 93 FL (ref 78–100)
MCV RBC AUTO: 95 FL (ref 78–100)
MONOCYTES # BLD AUTO: 0.9 10E3/UL (ref 0–1.3)
MONOCYTES NFR BLD AUTO: 11 %
MUCOUS THREADS #/AREA URNS LPF: PRESENT /LPF
NEUTROPHILS # BLD AUTO: 4.4 10E3/UL (ref 1.6–8.3)
NEUTROPHILS NFR BLD AUTO: 52 %
NITRATE UR QL: NEGATIVE
NRBC # BLD AUTO: 0 10E3/UL
NRBC BLD AUTO-RTO: 0 /100
O2/TOTAL GAS SETTING VFR VENT: 21 %
OPIATES UR QL SCN: NORMAL
OSMOLALITY SERPL: 316 MMOL/KG (ref 275–295)
OXYHGB MFR BLDV: 54 % (ref 70–75)
OXYHGB MFR BLDV: 63 % (ref 70–75)
OXYHGB MFR BLDV: 66 % (ref 70–75)
PCO2 BLDV: 28 MM HG (ref 40–50)
PCO2 BLDV: 28 MM HG (ref 40–50)
PCO2 BLDV: 39 MM HG (ref 40–50)
PCP QUAL URINE (ROCHE): NORMAL
PH BLDV: 7.01 [PH] (ref 7.32–7.43)
PH BLDV: 7.19 [PH] (ref 7.32–7.43)
PH BLDV: 7.3 [PH] (ref 7.32–7.43)
PH UR STRIP: 5 [PH] (ref 5–7)
PHOSPHATE SERPL-MCNC: 1.8 MG/DL (ref 2.5–4.5)
PHOSPHATE SERPL-MCNC: 2.5 MG/DL (ref 2.5–4.5)
PHOSPHATE SERPL-MCNC: 2.7 MG/DL (ref 2.5–4.5)
PHOSPHATE SERPL-MCNC: 5.2 MG/DL (ref 2.5–4.5)
PLATELET # BLD AUTO: 150 10E3/UL (ref 150–450)
PLATELET # BLD AUTO: 252 10E3/UL (ref 150–450)
PO2 BLDV: 31 MM HG (ref 25–47)
PO2 BLDV: 31 MM HG (ref 25–47)
PO2 BLDV: 36 MM HG (ref 25–47)
POTASSIUM SERPL-SCNC: 3.2 MMOL/L (ref 3.4–5.3)
POTASSIUM SERPL-SCNC: 3.2 MMOL/L (ref 3.4–5.3)
POTASSIUM SERPL-SCNC: 3.7 MMOL/L (ref 3.4–5.3)
POTASSIUM SERPL-SCNC: 3.8 MMOL/L (ref 3.4–5.3)
POTASSIUM SERPL-SCNC: 4.2 MMOL/L (ref 3.4–5.3)
POTASSIUM SERPL-SCNC: 4.7 MMOL/L (ref 3.4–5.3)
POTASSIUM SERPL-SCNC: 4.8 MMOL/L (ref 3.4–5.3)
POTASSIUM SERPL-SCNC: 4.9 MMOL/L (ref 3.4–5.3)
POTASSIUM SERPL-SCNC: 4.9 MMOL/L (ref 3.4–5.3)
PROT SERPL-MCNC: 5.9 G/DL (ref 6.4–8.3)
PROT SERPL-MCNC: 6.9 G/DL (ref 6.4–8.3)
PROT SERPL-MCNC: 8.2 G/DL (ref 6.4–8.3)
RBC # BLD AUTO: 3.94 10E6/UL (ref 4.4–5.9)
RBC # BLD AUTO: 4.95 10E6/UL (ref 4.4–5.9)
RBC URINE: 0 /HPF
SAO2 % BLDV: 55.4 % (ref 70–75)
SAO2 % BLDV: 63.8 % (ref 70–75)
SAO2 % BLDV: 67.7 % (ref 70–75)
SODIUM SERPL-SCNC: 132 MMOL/L (ref 135–145)
SODIUM SERPL-SCNC: 135 MMOL/L (ref 135–145)
SODIUM SERPL-SCNC: 136 MMOL/L (ref 135–145)
SODIUM SERPL-SCNC: 137 MMOL/L (ref 135–145)
SODIUM SERPL-SCNC: 138 MMOL/L (ref 135–145)
SODIUM SERPL-SCNC: 139 MMOL/L (ref 135–145)
SP GR UR STRIP: 1.02 (ref 1–1.03)
TSH SERPL DL<=0.005 MIU/L-ACNC: 1.51 UIU/ML (ref 0.3–4.2)
UROBILINOGEN UR STRIP-MCNC: NORMAL MG/DL
WBC # BLD AUTO: 3.3 10E3/UL (ref 4–11)
WBC # BLD AUTO: 8.5 10E3/UL (ref 4–11)
WBC URINE: <1 /HPF

## 2024-01-01 PROCEDURE — 99207 PR APP CREDIT; MD BILLING SHARED VISIT: CPT

## 2024-01-01 PROCEDURE — 85027 COMPLETE CBC AUTOMATED: CPT

## 2024-01-01 PROCEDURE — 83930 ASSAY OF BLOOD OSMOLALITY: CPT

## 2024-01-01 PROCEDURE — 250N000009 HC RX 250

## 2024-01-01 PROCEDURE — 84100 ASSAY OF PHOSPHORUS: CPT

## 2024-01-01 PROCEDURE — 82374 ASSAY BLOOD CARBON DIOXIDE: CPT | Performed by: STUDENT IN AN ORGANIZED HEALTH CARE EDUCATION/TRAINING PROGRAM

## 2024-01-01 PROCEDURE — 258N000003 HC RX IP 258 OP 636

## 2024-01-01 PROCEDURE — 82077 ASSAY SPEC XCP UR&BREATH IA: CPT | Performed by: STUDENT IN AN ORGANIZED HEALTH CARE EDUCATION/TRAINING PROGRAM

## 2024-01-01 PROCEDURE — 84075 ASSAY ALKALINE PHOSPHATASE: CPT

## 2024-01-01 PROCEDURE — 81001 URINALYSIS AUTO W/SCOPE: CPT | Performed by: STUDENT IN AN ORGANIZED HEALTH CARE EDUCATION/TRAINING PROGRAM

## 2024-01-01 PROCEDURE — 84132 ASSAY OF SERUM POTASSIUM: CPT | Performed by: INTERNAL MEDICINE

## 2024-01-01 PROCEDURE — 258N000003 HC RX IP 258 OP 636: Performed by: STUDENT IN AN ORGANIZED HEALTH CARE EDUCATION/TRAINING PROGRAM

## 2024-01-01 PROCEDURE — 80307 DRUG TEST PRSMV CHEM ANLYZR: CPT | Performed by: STUDENT IN AN ORGANIZED HEALTH CARE EDUCATION/TRAINING PROGRAM

## 2024-01-01 PROCEDURE — 96366 THER/PROPH/DIAG IV INF ADDON: CPT

## 2024-01-01 PROCEDURE — 84443 ASSAY THYROID STIM HORMONE: CPT | Performed by: STUDENT IN AN ORGANIZED HEALTH CARE EDUCATION/TRAINING PROGRAM

## 2024-01-01 PROCEDURE — 99223 1ST HOSP IP/OBS HIGH 75: CPT | Mod: FS | Performed by: STUDENT IN AN ORGANIZED HEALTH CARE EDUCATION/TRAINING PROGRAM

## 2024-01-01 PROCEDURE — 83605 ASSAY OF LACTIC ACID: CPT | Performed by: STUDENT IN AN ORGANIZED HEALTH CARE EDUCATION/TRAINING PROGRAM

## 2024-01-01 PROCEDURE — 96361 HYDRATE IV INFUSION ADD-ON: CPT

## 2024-01-01 PROCEDURE — 96375 TX/PRO/DX INJ NEW DRUG ADDON: CPT

## 2024-01-01 PROCEDURE — 99291 CRITICAL CARE FIRST HOUR: CPT | Mod: 25 | Performed by: STUDENT IN AN ORGANIZED HEALTH CARE EDUCATION/TRAINING PROGRAM

## 2024-01-01 PROCEDURE — 36415 COLL VENOUS BLD VENIPUNCTURE: CPT | Performed by: INTERNAL MEDICINE

## 2024-01-01 PROCEDURE — 36415 COLL VENOUS BLD VENIPUNCTURE: CPT

## 2024-01-01 PROCEDURE — 84100 ASSAY OF PHOSPHORUS: CPT | Performed by: STUDENT IN AN ORGANIZED HEALTH CARE EDUCATION/TRAINING PROGRAM

## 2024-01-01 PROCEDURE — 83735 ASSAY OF MAGNESIUM: CPT

## 2024-01-01 PROCEDURE — 82010 KETONE BODYS QUAN: CPT

## 2024-01-01 PROCEDURE — 83735 ASSAY OF MAGNESIUM: CPT | Performed by: STUDENT IN AN ORGANIZED HEALTH CARE EDUCATION/TRAINING PROGRAM

## 2024-01-01 PROCEDURE — 82962 GLUCOSE BLOOD TEST: CPT

## 2024-01-01 PROCEDURE — 82805 BLOOD GASES W/O2 SATURATION: CPT

## 2024-01-01 PROCEDURE — 36415 COLL VENOUS BLD VENIPUNCTURE: CPT | Performed by: EMERGENCY MEDICINE

## 2024-01-01 PROCEDURE — 83605 ASSAY OF LACTIC ACID: CPT

## 2024-01-01 PROCEDURE — 250N000012 HC RX MED GY IP 250 OP 636 PS 637: Performed by: STUDENT IN AN ORGANIZED HEALTH CARE EDUCATION/TRAINING PROGRAM

## 2024-01-01 PROCEDURE — 99291 CRITICAL CARE FIRST HOUR: CPT | Mod: 25

## 2024-01-01 PROCEDURE — 120N000004 HC R&B MS OVERFLOW

## 2024-01-01 PROCEDURE — 80048 BASIC METABOLIC PNL TOTAL CA: CPT | Performed by: INTERNAL MEDICINE

## 2024-01-01 PROCEDURE — 96365 THER/PROPH/DIAG IV INF INIT: CPT

## 2024-01-01 PROCEDURE — 250N000013 HC RX MED GY IP 250 OP 250 PS 637: Performed by: STUDENT IN AN ORGANIZED HEALTH CARE EDUCATION/TRAINING PROGRAM

## 2024-01-01 PROCEDURE — 82805 BLOOD GASES W/O2 SATURATION: CPT | Performed by: STUDENT IN AN ORGANIZED HEALTH CARE EDUCATION/TRAINING PROGRAM

## 2024-01-01 PROCEDURE — 82040 ASSAY OF SERUM ALBUMIN: CPT

## 2024-01-01 PROCEDURE — 85025 COMPLETE CBC W/AUTO DIFF WBC: CPT | Performed by: STUDENT IN AN ORGANIZED HEALTH CARE EDUCATION/TRAINING PROGRAM

## 2024-01-01 PROCEDURE — 83036 HEMOGLOBIN GLYCOSYLATED A1C: CPT | Performed by: STUDENT IN AN ORGANIZED HEALTH CARE EDUCATION/TRAINING PROGRAM

## 2024-01-01 PROCEDURE — 82010 KETONE BODYS QUAN: CPT | Performed by: STUDENT IN AN ORGANIZED HEALTH CARE EDUCATION/TRAINING PROGRAM

## 2024-01-01 PROCEDURE — 36415 COLL VENOUS BLD VENIPUNCTURE: CPT | Performed by: STUDENT IN AN ORGANIZED HEALTH CARE EDUCATION/TRAINING PROGRAM

## 2024-01-01 PROCEDURE — 84132 ASSAY OF SERUM POTASSIUM: CPT | Performed by: EMERGENCY MEDICINE

## 2024-01-01 PROCEDURE — 84450 TRANSFERASE (AST) (SGOT): CPT

## 2024-01-01 PROCEDURE — 250N000011 HC RX IP 250 OP 636: Performed by: STUDENT IN AN ORGANIZED HEALTH CARE EDUCATION/TRAINING PROGRAM

## 2024-01-01 PROCEDURE — 250N000009 HC RX 250: Performed by: STUDENT IN AN ORGANIZED HEALTH CARE EDUCATION/TRAINING PROGRAM

## 2024-01-01 PROCEDURE — 250N000013 HC RX MED GY IP 250 OP 250 PS 637

## 2024-01-01 PROCEDURE — 82040 ASSAY OF SERUM ALBUMIN: CPT | Performed by: STUDENT IN AN ORGANIZED HEALTH CARE EDUCATION/TRAINING PROGRAM

## 2024-01-01 PROCEDURE — 99239 HOSP IP/OBS DSCHRG MGMT >30: CPT | Performed by: STUDENT IN AN ORGANIZED HEALTH CARE EDUCATION/TRAINING PROGRAM

## 2024-01-01 RX ORDER — NICOTINE POLACRILEX 4 MG
15-30 LOZENGE BUCCAL
Status: DISCONTINUED | OUTPATIENT
Start: 2024-01-01 | End: 2024-01-01 | Stop reason: HOSPADM

## 2024-01-01 RX ORDER — INSULIN LISPRO 100 [IU]/ML
INJECTION, SOLUTION INTRAVENOUS; SUBCUTANEOUS
Qty: 45 ML | Refills: 2 | Status: SHIPPED | OUTPATIENT
Start: 2024-01-01 | End: 2024-01-01

## 2024-01-01 RX ORDER — DEXTROSE MONOHYDRATE 25 G/50ML
25-50 INJECTION, SOLUTION INTRAVENOUS
Status: DISCONTINUED | OUTPATIENT
Start: 2024-01-01 | End: 2024-01-01

## 2024-01-01 RX ORDER — NALOXONE HYDROCHLORIDE 0.4 MG/ML
0.2 INJECTION, SOLUTION INTRAMUSCULAR; INTRAVENOUS; SUBCUTANEOUS
Status: DISCONTINUED | OUTPATIENT
Start: 2024-01-01 | End: 2024-01-01 | Stop reason: HOSPADM

## 2024-01-01 RX ORDER — ONDANSETRON 2 MG/ML
4 INJECTION INTRAMUSCULAR; INTRAVENOUS EVERY 30 MIN PRN
Status: DISCONTINUED | OUTPATIENT
Start: 2024-01-01 | End: 2024-01-01 | Stop reason: HOSPADM

## 2024-01-01 RX ORDER — ONDANSETRON 4 MG/1
4 TABLET, ORALLY DISINTEGRATING ORAL EVERY 6 HOURS PRN
Status: DISCONTINUED | OUTPATIENT
Start: 2024-01-01 | End: 2024-01-01 | Stop reason: HOSPADM

## 2024-01-01 RX ORDER — LISINOPRIL 20 MG/1
20 TABLET ORAL DAILY
Qty: 90 TABLET | Refills: 1 | Status: SHIPPED | OUTPATIENT
Start: 2024-01-01

## 2024-01-01 RX ORDER — AMOXICILLIN 250 MG
2 CAPSULE ORAL 2 TIMES DAILY PRN
Status: DISCONTINUED | OUTPATIENT
Start: 2024-01-01 | End: 2024-01-01 | Stop reason: HOSPADM

## 2024-01-01 RX ORDER — ATORVASTATIN CALCIUM 20 MG/1
20 TABLET, FILM COATED ORAL DAILY
Status: DISCONTINUED | OUTPATIENT
Start: 2024-01-01 | End: 2024-01-01 | Stop reason: HOSPADM

## 2024-01-01 RX ORDER — AMOXICILLIN 250 MG
1 CAPSULE ORAL 2 TIMES DAILY PRN
Status: DISCONTINUED | OUTPATIENT
Start: 2024-01-01 | End: 2024-01-01 | Stop reason: HOSPADM

## 2024-01-01 RX ORDER — ATORVASTATIN CALCIUM 20 MG/1
20 TABLET, FILM COATED ORAL DAILY
Qty: 90 TABLET | Refills: 0 | OUTPATIENT
Start: 2024-01-01

## 2024-01-01 RX ORDER — NICOTINE POLACRILEX 4 MG
15-30 LOZENGE BUCCAL
Status: DISCONTINUED | OUTPATIENT
Start: 2024-01-01 | End: 2024-01-01

## 2024-01-01 RX ORDER — SODIUM CHLORIDE 9 MG/ML
1000 INJECTION, SOLUTION INTRAVENOUS CONTINUOUS
Status: DISCONTINUED | OUTPATIENT
Start: 2024-01-01 | End: 2024-01-01 | Stop reason: HOSPADM

## 2024-01-01 RX ORDER — INSULIN DEGLUDEC 100 U/ML
36 INJECTION, SOLUTION SUBCUTANEOUS DAILY
Qty: 30 ML | Refills: 0 | Status: SHIPPED | OUTPATIENT
Start: 2024-01-01

## 2024-01-01 RX ORDER — DEXTROSE MONOHYDRATE 25 G/50ML
25-50 INJECTION, SOLUTION INTRAVENOUS
Status: DISCONTINUED | OUTPATIENT
Start: 2024-01-01 | End: 2024-01-01 | Stop reason: HOSPADM

## 2024-01-01 RX ORDER — NALOXONE HYDROCHLORIDE 0.4 MG/ML
0.4 INJECTION, SOLUTION INTRAMUSCULAR; INTRAVENOUS; SUBCUTANEOUS
Status: DISCONTINUED | OUTPATIENT
Start: 2024-01-01 | End: 2024-01-01 | Stop reason: HOSPADM

## 2024-01-01 RX ORDER — HYDROMORPHONE HCL IN WATER/PF 6 MG/30 ML
0.2 PATIENT CONTROLLED ANALGESIA SYRINGE INTRAVENOUS
Status: DISCONTINUED | OUTPATIENT
Start: 2024-01-01 | End: 2024-01-01

## 2024-01-01 RX ORDER — POTASSIUM CHLORIDE 1500 MG/1
40 TABLET, EXTENDED RELEASE ORAL ONCE
Status: COMPLETED | OUTPATIENT
Start: 2024-01-01 | End: 2024-01-01

## 2024-01-01 RX ORDER — ONDANSETRON 2 MG/ML
4 INJECTION INTRAMUSCULAR; INTRAVENOUS EVERY 6 HOURS PRN
Status: DISCONTINUED | OUTPATIENT
Start: 2024-01-01 | End: 2024-01-01 | Stop reason: HOSPADM

## 2024-01-01 RX ORDER — LISINOPRIL 20 MG/1
20 TABLET ORAL DAILY
Status: DISCONTINUED | OUTPATIENT
Start: 2024-01-01 | End: 2024-01-01 | Stop reason: HOSPADM

## 2024-01-01 RX ORDER — INSULIN LISPRO 100 [IU]/ML
INJECTION, SOLUTION INTRAVENOUS; SUBCUTANEOUS
Qty: 45 ML | Refills: 1 | Status: SHIPPED | OUTPATIENT
Start: 2024-01-01

## 2024-01-01 RX ORDER — DEXTROSE MONOHYDRATE, SODIUM CHLORIDE, AND POTASSIUM CHLORIDE 50; 1.49; 4.5 G/1000ML; G/1000ML; G/1000ML
INJECTION, SOLUTION INTRAVENOUS CONTINUOUS
Status: DISCONTINUED | OUTPATIENT
Start: 2024-01-01 | End: 2024-01-01

## 2024-01-01 RX ADMIN — POTASSIUM CHLORIDE 40 MEQ: 1500 TABLET, EXTENDED RELEASE ORAL at 08:19

## 2024-01-01 RX ADMIN — ATORVASTATIN CALCIUM 20 MG: 20 TABLET, FILM COATED ORAL at 08:19

## 2024-01-01 RX ADMIN — SODIUM CHLORIDE 1000 ML: 9 INJECTION, SOLUTION INTRAVENOUS at 05:16

## 2024-01-01 RX ADMIN — POTASSIUM CHLORIDE, DEXTROSE MONOHYDRATE AND SODIUM CHLORIDE: 150; 5; 450 INJECTION, SOLUTION INTRAVENOUS at 20:57

## 2024-01-01 RX ADMIN — INSULIN HUMAN 4 UNITS/HR: 1 INJECTION, SOLUTION INTRAVENOUS at 04:11

## 2024-01-01 RX ADMIN — SODIUM CHLORIDE 1000 ML: 9 INJECTION, SOLUTION INTRAVENOUS at 04:39

## 2024-01-01 RX ADMIN — ONDANSETRON 4 MG: 2 INJECTION INTRAMUSCULAR; INTRAVENOUS at 04:03

## 2024-01-01 RX ADMIN — ATORVASTATIN CALCIUM 20 MG: 20 TABLET, FILM COATED ORAL at 18:10

## 2024-01-01 RX ADMIN — POTASSIUM CHLORIDE, DEXTROSE MONOHYDRATE AND SODIUM CHLORIDE: 150; 5; 450 INJECTION, SOLUTION INTRAVENOUS at 13:18

## 2024-01-01 RX ADMIN — SODIUM CHLORIDE 1000 ML: 9 INJECTION, SOLUTION INTRAVENOUS at 03:59

## 2024-01-01 RX ADMIN — INSULIN HUMAN 5.5 UNITS/HR: 1 INJECTION, SOLUTION INTRAVENOUS at 04:58

## 2024-01-01 RX ADMIN — INSULIN ASPART 1 UNITS: 100 INJECTION, SOLUTION INTRAVENOUS; SUBCUTANEOUS at 11:53

## 2024-01-01 RX ADMIN — POTASSIUM CHLORIDE, DEXTROSE MONOHYDRATE AND SODIUM CHLORIDE: 150; 5; 450 INJECTION, SOLUTION INTRAVENOUS at 04:50

## 2024-01-01 RX ADMIN — INSULIN GLARGINE 18 UNITS: 100 INJECTION, SOLUTION SUBCUTANEOUS at 08:29

## 2024-01-01 ASSESSMENT — ACTIVITIES OF DAILY LIVING (ADL)
CHANGE_IN_FUNCTIONAL_STATUS_SINCE_ONSET_OF_CURRENT_ILLNESS/INJURY: NO
ADLS_ACUITY_SCORE: 21
ADLS_ACUITY_SCORE: 21
ADLS_ACUITY_SCORE: 35
ADLS_ACUITY_SCORE: 21
ADLS_ACUITY_SCORE: 21
ADLS_ACUITY_SCORE: 31
DOING_ERRANDS_INDEPENDENTLY_DIFFICULTY: NO
ADLS_ACUITY_SCORE: 21
ADLS_ACUITY_SCORE: 21
ADLS_ACUITY_SCORE: 35
ADLS_ACUITY_SCORE: 21
ADLS_ACUITY_SCORE: 35
ADLS_ACUITY_SCORE: 21
TOILETING_ISSUES: NO
ADLS_ACUITY_SCORE: 35
ADLS_ACUITY_SCORE: 21
FALL_HISTORY_WITHIN_LAST_SIX_MONTHS: NO
ADLS_ACUITY_SCORE: 21
ADLS_ACUITY_SCORE: 21
ADLS_ACUITY_SCORE: 35
ADLS_ACUITY_SCORE: 35
WALKING_OR_CLIMBING_STAIRS_DIFFICULTY: NO
DRESSING/BATHING_DIFFICULTY: NO
ADLS_ACUITY_SCORE: 21
DIFFICULTY_EATING/SWALLOWING: NO
ADLS_ACUITY_SCORE: 21
ADLS_ACUITY_SCORE: 35

## 2024-01-01 ASSESSMENT — COLUMBIA-SUICIDE SEVERITY RATING SCALE - C-SSRS
6. HAVE YOU EVER DONE ANYTHING, STARTED TO DO ANYTHING, OR PREPARED TO DO ANYTHING TO END YOUR LIFE?: NO
2. HAVE YOU ACTUALLY HAD ANY THOUGHTS OF KILLING YOURSELF IN THE PAST MONTH?: NO
6. HAVE YOU EVER DONE ANYTHING, STARTED TO DO ANYTHING, OR PREPARED TO DO ANYTHING TO END YOUR LIFE?: NO
1. IN THE PAST MONTH, HAVE YOU WISHED YOU WERE DEAD OR WISHED YOU COULD GO TO SLEEP AND NOT WAKE UP?: NO
1. IN THE PAST MONTH, HAVE YOU WISHED YOU WERE DEAD OR WISHED YOU COULD GO TO SLEEP AND NOT WAKE UP?: NO
2. HAVE YOU ACTUALLY HAD ANY THOUGHTS OF KILLING YOURSELF IN THE PAST MONTH?: NO

## 2024-05-01 PROBLEM — E10.10 DIABETIC KETOACIDOSIS WITHOUT COMA ASSOCIATED WITH TYPE 1 DIABETES MELLITUS (H): Status: ACTIVE | Noted: 2024-01-01

## 2024-05-01 PROBLEM — E10.9 TYPE 1 DIABETES MELLITUS WITHOUT COMPLICATION (H): Status: ACTIVE | Noted: 2019-12-09

## 2024-05-01 PROBLEM — E11.10 DKA (DIABETIC KETOACIDOSIS) (H): Status: ACTIVE | Noted: 2024-01-01

## 2024-05-01 PROBLEM — E78.5 HYPERLIPIDEMIA LDL GOAL <100: Status: ACTIVE | Noted: 2019-12-09

## 2024-05-01 NOTE — ED TRIAGE NOTES
"Pt reports \"feeling off\" for the last 5 days, N/V beginning yesterday. Missed Lantus dose yesterday. BG has been elevated in the 200's.      Triage Assessment (Adult)       Row Name 05/01/24 0343          Triage Assessment    Airway WDL WDL        Respiratory WDL    Respiratory WDL X;rhythm/pattern        Skin Circulation/Temperature WDL    Skin Circulation/Temperature WDL WDL        Cardiac WDL    Cardiac WDL X;rhythm     Pulse Rate & Regularity tachycardic        Peripheral/Neurovascular WDL    Peripheral Neurovascular WDL WDL        Cognitive/Neuro/Behavioral WDL    Cognitive/Neuro/Behavioral WDL WDL                     "

## 2024-05-01 NOTE — ED NOTES
Pt was given washcloths and towels - assisted to wash up per preference. Toothbrush and toothpaste provided. Wanted mouth swabs with ice water.  Pt is now resting comfortably.

## 2024-05-01 NOTE — PLAN OF CARE
Problem: Gas Exchange Impaired  Goal: Optimal Gas Exchange  Outcome: Progressing        Noted improvement in labs. Patient continues on insulin drip. Noted increase after supper as expected. Did not jump algorithms at this time r/t the fast titration at onset of the insulin drip in the ED. Will increase algorithm if increased at 1900 check. Patient's resp rate is even and unlabored, no noted Kussmaul breathing as reported from ED.

## 2024-05-01 NOTE — TELEPHONE ENCOUNTER
atorvastatin (LIPITOR) 20 MG tablet   Disp-90 tablet, R-0,   Start: 06/20/2023     insulin degludec (TRESIBA FLEXTOUCH) 100 UNIT/ML pen   Disp-45 mL, R-1,   Start: 06/15/2023       9/15/2023  [facility] Alisson Swanson PA-C  Endocrinology, Diabetes, and Metabolism     Pt current hosp admit.

## 2024-05-01 NOTE — PROGRESS NOTES
"[facility] [facility] ADMISSION NOTE    Patient admitted to room ICU 7 at approximately 1150 via cart from [place] ED. Patient was accompanied by other:EMS.     Verbal SBAR report received from HARRISON Escamilla prior to patient arrival.     Patient ambulated to bed independently. Patient alert and oriented X 3. The patient is not having any pain.  . Admission vital signs: Blood pressure 113/65, pulse 90, temperature 98.6  F (37  C), temperature source Oral, resp. rate 17, height 1.854 m (6' 1\"), weight 107.2 kg (236 lb 5.3 oz), SpO2 97%. Patient was oriented to plan of care, call light, bed controls, tv, telephone, bathroom, and visiting hours.     Risk Assessment    The following safety risks were identified during admission: none. Yellow risk band applied: NO.     Skin Initial Assessment    This writer admitted this patient and completed a full skin assessment and Emanuel score in the Adult PCS flowsheet. Appropriate interventions initiated as needed.     Secondary skin check completed by patient refused skin check, refused to take of jearsalan.    Emanuel Risk Assessment  Sensory Perception: 4-->no impairment  Moisture: 4-->rarely moist  Activity: 4-->walks frequently  Mobility: 4-->no limitation  Nutrition: 3-->adequate  Friction and Shear: 3-->no apparent problem  Emanuel Score: 22  Mattress: Low air loss (ABHAY pump, Isolibrium, Skin Guard, Pulsate, Isoair, etc.)  Bed Frame: Standard width and length    Education    Patient has a [place] to Observation order: No  Observation education completed and documented: N/A    Patient left on 1.5 insulin drip and Q1 hour blood glucose checks done per protocol.Admitting provider aware and was awaiting continuation of orders.       Dedrick Sagastume RN      "

## 2024-05-01 NOTE — ED NOTES
Pt reports he ran out of his glucose monitoring supplies and has not monitored in the last two days.

## 2024-05-01 NOTE — PROGRESS NOTES
"    ICU Tele Note (05/01/2024)     Date of Hospital Admission: 5/1/2024  Code Status: Full Code    Reason for Visit  DKA    Summary  28 year old male with history of type 1 diabetes, hyperlipidemia who presents for evaluation of nausea, vomiting, hyperglycemia.  Patient has not been checking his blood glucose or taking his insulin regularly for the past 3 to 4 days.  He developed nausea/vomiting yesterday and feels very dehydrated/fatigued.  Patient otherwise denies any fevers, chills, cough or cold symptoms, chest or abdominal pain, changes in bowel or urinary habits, other complaints today.  Last episode of DKA was a few years ago.       Objective   Vital Signs  Blood pressure 116/67, pulse 80, temperature 98.8  F (37.1  C), temperature source Oral, resp. rate 13, height 1.854 m (6' 1\"), weight 107.2 kg (236 lb 5.3 oz), SpO2 99%.  I/O last 3 completed shifts:  In: 90.75 [I.V.:90.75]  Out: -   Resp: 13    Diagnostic Data  The following portions of the patient's chart were reviewed: current medications, problem list, laboratory workup, and diagnostic data.    Most Recent Pertinent Labs  Lab Results   Component Value Date    WBC 8.5 05/01/2024    HGB 16.0 05/01/2024    HCT 47.2 05/01/2024     05/01/2024     05/01/2024    POTASSIUM 4.7 05/01/2024    CHLORIDE 103 05/01/2024    CO2 10 (LL) 05/01/2024    BUN 11.1 05/01/2024    CR 1.00 05/01/2024     (H) 05/01/2024    AST 26 05/01/2024    ALT 64 05/01/2024    ALKPHOS 134 05/01/2024     Infusion Medications  Current Facility-Administered Medications   Medication Dose Route Frequency Provider Last Rate Last Admin    dextrose 5% and 0.45% NaCl + KCl 20 mEq/L infusion   Intravenous Continuous Pa Sands PA-C 125 mL/hr at 05/01/24 1318 New Bag at 05/01/24 1318    insulin regular (MYXREDLIN) 1 unit/mL infusion   Intravenous Continuous Pa Sands PA-C 1.5 mL/hr at 05/01/24 1600 1.5 Units/hr at 05/01/24 1600     Scheduled Medications  Current " Facility-Administered Medications   Medication Dose Route Frequency Provider Last Rate Last Admin        Assessment & Plan   Patient Active Problem List   Diagnosis    Type 1 diabetes mellitus without complication (H)    Hyperlipidemia LDL goal <100    Morbid obesity (H)    Diabetic ketoacidosis without coma associated with type 1 diabetes mellitus (H)    DKA (diabetic ketoacidosis) (H)     I reviewed the patient's medical chart including his laboratory data and notes.  The presentation is consistent with diabetic ketoacidosis in the setting of nonadherence with insulin.  Agree with IV fluids, and insulin drip per the DKA protocol.  One recommendation is to do basic metabolic panel every 4 hours until his gap closes.  I will order this in his chart.

## 2024-05-01 NOTE — H&P
[facility]    History and Physical  Hospital Medicine       Date of Admission:  5/1/2024  Date of Service: 5/1/2024     Assessment & Plan   Owen Solomon is a 28 year old male with a past medical hx of T1DM, HLD who presents to [facility] from [facility] ER on 5/1/2024 with a diagnosis of DKA.    Diabetic ketoacidosis without coma associated with type 1 diabetes mellitus (H)  Anion gap metabolic acidosis    Pts CGM stopped working 3-4d ago and he stopped taking his insulin. Developed fatigue, n/v, increased thirst, SOB on am of 5/1. Afebrile. Tachycardic to 130s in ER, now improved to 90s. SpO2 high 90s on RA.     Initial glucose 496, now 170-200. Ketones >9 ?  >9. VBG with pH 7.01, CO2 28, bicarb 7. Repeat VBG with pH 7.19, CO2 28, bicarb 11. Anion gap 38 ?  26. Lactate 2.2 ?  1.2. A1c 8.6% on 5/1/24. Phos 5.2 ?  2.7. Alcohol, TSH, WBC, Mg, K WNL. Na initially 132, but normal when corrected for hyperglycemia.     He transferred to [facility] from [facility] due to lack of bed availability. In the ER he received an insulin drip, 2L NS, and started on NS 125ml/hr. He arrived on 1.5U insulin/hr. Pt feels significantly better on arrival to [facility].    - D5 1/2 NS 125ml/hr  - Continue insulin drip until anion gap closes and acidosis resolves, then transition to subcutaneous  - Initiate Glargine 18U Qam with sliding scale insulin after anion gap closes  - Ketone check Q4h until 5/2 am  - Electrolyte recheck at 1800 5/1  - Consistent carb diet    MONICA, mild, resolved  Cr 1.23 ?  1.00 (baseline 0.73-0.87).   - CMP in am  - Holding PTA Lisinopril    Elevated LFTs, resolved  Alk phos 169 and ALT 83 on presentation, and normalized on arrival to [facility]. Other LFTs WNL. LFTs haven't been previously elevated. Pt endorses drinking alcohol 2-3x/wk and having 4-5 drinks on each occasion. He last drank 4/29 pm. No RUQ pain.  - CMP in am    Hyperlipidemia LDL goal <100  Managed prior to admission with Atorvastatin 20,  "continue.    Obesity  BMI 31.2. Contributes to morbidity and mortality.        Clinically Significant Risk Factors Present on Admission             # Anion Gap Metabolic Acidosis: Highest Anion Gap = 38 mmol/L in last 2 days, will monitor and treat as appropriate      # Hypertension: Home medication list includes antihypertensive(s)      # Obesity: Estimated body mass index is 31.18 kg/m  as calculated from the following:    Height as of this encounter: 1.854 m (6' 1\").    Weight as of this encounter: 107.2 kg (236 lb 5.3 oz).               Diet: Moderate Consistent Carb (60 g CHO per Meal) Diet  DVT Prophylaxis: Ambulate every shift  Moya Catheter: Not present  Code Status: Full Code  Lines: peripheral IV    Disposition Plan      Expected Discharge Date: 05/03/2024             Entered: Pa Sands PA-C 05/01/2024, 12:03 PM     Status: Patient is appropriate for inpatient admission for DKA management. Pt much improved after treatment in the ER. Glucose normalized but ketones still >9 and pH 7.19 so will require D5 with fluids and continuous insulin drip.  Pa Sands PA-C        The patient's care was discussed with the Attending Physician, Dr. Ferreira .    Primary Care Physician   Liberty King [phone]    History is obtained from the patient and review of old records via the EMR.    History of Present Illness   Owen Solomon is a 28 year old male with a past medical hx of T1DM, HLD who presents to [facility] on 5/1/2024 with a diagnosis of DKA.    Pt has a hx of DKA diagnosed in 2018 after an episode of DKA. Had one more episode of DKA in 2022. He states his Carmen CGM stopped working 3-4d ago and he stopped taking his insulin and checking his sugars. He does not have materials at home to check his sugars with fingerstick. He states he felt nauseous, dehydrated, fatigued, SOB this am. He vomited this am too. He presented to the ER and was found to have DKA. He was transferred " here due to lack of beds at [facility]. Denies fevers, chills, cough, chest pain, abd pain, bowel or urinary changes, alcohol use, tylenol use, appetite changes. He states his weight is around 245-250lbs, and is 236lbs on admission.    Review of Systems   The 5 point Review of Systems is negative other than noted in the HPI or here.     Past Medical History    Past Medical History:   Diagnosis Date    Type 1 diabetes (H)      Patient Active Problem List    Diagnosis Date Noted    Diabetic ketoacidosis without coma associated with type 1 diabetes mellitus (H) 2024     Priority: Medium    DKA (diabetic ketoacidosis) (H) 2024     Priority: Medium    Morbid obesity (H) 2020     Priority: Medium    Type 1 diabetes mellitus without complication (H) 2019     Priority: Medium    Hyperlipidemia LDL goal <100 2019     Priority: Medium        Past Surgical History   No past surgical history on file.     Prior to Admission Medications   Prior to Admission Medications   Prescriptions Last Dose Informant Patient Reported? Taking?   Continuous Blood Gluc  (FREESTYLE PAUL 14 DAY READER) A[place]LAIDE   No No   Si Device 5 times daily Use to monitor blood sugars per manufacture instructions   Continuous Blood Gluc Sensor (FREESTYLE PAUL 14 DAY SENSOR) [facility]   No No   Sig: USE AS DIRECTED 6 TIMES DAILY. CHANGE EVERY 14 DAYS AS DIRECTED   Continuous Blood Gluc Sensor (FREESTYLE PAUL 3 SENSOR) [facility]   No No   Si each every 14 days   Glucagon 3 MG/DOSE POWD   No No   Sig: Spray 1 each in nostril as needed   KETOSTIX test strip   No No   Sig: by TABLE SOLN route as needed   Multiple Vitamins-Minerals (SUPER THERA CHANO M) TABS   Yes No   Sig: Take 1 tablet by mouth daily   TRESIBA FLEXTOUCH 100 UNIT/ML pen   No No   Sig: INJECT 36 UNITS SUBCUTANEOUS DAILY   atorvastatin (LIPITOR) 20 MG tablet   No No   Sig: Take 1 tablet (20 mg) by mouth daily   cetirizine (ZYRTEC) 10 MG tablet   Yes No   Sig: Take 10  "mg by mouth daily   insulin lispro (HUMALOG KWIKPEN) 100 UNIT/ML (1 unit dial) KWIKPEN   No No   Si UNIT PER 7 GRAMS AT BREAKFAST, LUNCH, DINNER AND SNACKS. USES UP TO 50 UNITS DAILY   insulin pen needle (32G X 4 MM) 32G X 4 MM miscellaneous   No No   Sig: Use 5 pen needles daily or as directed.   lisinopril (ZESTRIL) 20 MG tablet   No No   Sig: Take 1 tablet (20 mg) by mouth daily      Facility-Administered Medications: None     Allergies   No Known Allergies    Family History    Family History   Problem Relation Age of Onset    Thyroid Disease Mother     Diabetes Type 1 Brother     Heart Disease Maternal Grandfather      Social History   Social History     Socioeconomic History    Marital status: Single     Spouse name: Not on file    Number of children: Not on file    Years of education: Not on file    Highest education level: Not on file   Occupational History    Not on file   Tobacco Use    Smoking status: Never    Smokeless tobacco: Never   Substance and Sexual Activity    Alcohol use: Not Currently    Drug use: Not on file    Sexual activity: Not on file   Other Topics Concern    Not on file   Social History Narrative    Not on file     Social Determinants of Health     Financial Resource Strain: Not on file   Food Insecurity: Not on file   Transportation Needs: Not on file   Physical Activity: Not on file   Stress: Not on file   Social Connections: Unknown (2022)    Received from [email or web address] [facility] [email or web address] [facility]    Social Connections     Frequency of Communication with Friends and Family: Not on file   Interpersonal Safety: Not on file   Housing Stability: Not on file     Physical Exam   /78   Pulse 97   Temp 98.6  F (37  C) (Oral)   Resp 16   Ht 1.854 m (6' 1\")   Wt 107.2 kg (236 lb 5.3 oz)   SpO2 100%   BMI 31.18 kg/m       Weight: 236 lbs 5.33 oz Body mass index is 31.18 kg/m .     Constitutional: Alert, oriented, " cooperative, in no acute distress, appears nontoxic.  HENT: Oropharynx is clear and dry. No evidence of cranial trauma. Normocephalic. Eyes anicteric.   Cardiovascular: Regular rate and rhythm, normal S1 and S2, and no murmur noted. No lower extremity edema.  Respiratory: Clear to auscultation bilaterally with no adventitious breath sounds. No respiratory distress  GI: Soft, non-tender, normal bowel sounds, no hepatomegaly, no masses.  Musculoskeletal: Normal muscle bulk and tone. FROM in all extremities   Skin: Warm and dry, no rashes.   Neurologic: Cranial nerves 2-12 are grossly intact.       Data   Data reviewed today:   Recent Labs   Lab 05/01/24  1030 05/01/24  0951 05/01/24  0911 05/01/24  0739 05/01/24  0730 05/01/24  0607 05/01/24  0359   WBC  --   --   --   --   --   --  8.5   HGB  --   --   --   --   --   --  16.0   MCV  --   --   --   --   --   --  95   PLT  --   --   --   --   --   --  252   NA  --   --   --   --   --   --  132*   POTASSIUM  --   --   --   --  4.9  --  4.9   CHLORIDE  --   --   --   --   --   --  86*   CO2  --   --   --   --   --   --  8*   BUN  --   --   --   --   --   --  14.7   CR  --   --   --   --   --   --  1.23*   ANIONGAP  --   --   --   --   --   --  38*   JOSEF  --   --   --   --   --   --  9.6   * 192* 175*   < >  --    < > 496*   ALBUMIN  --   --   --   --   --   --  4.6   PROTTOTAL  --   --   --   --   --   --  8.2   BILITOTAL  --   --   --   --   --   --  0.4   ALKPHOS  --   --   --   --   --   --  169*   ALT  --   --   --   --   --   --  83*   AST  --   --   --   --   --   --  38    < > = values in this interval not displayed.       No results found for this or any previous visit (from the past 24 hour(s)).    I personally reviewed the EKG tracing showing NSR

## 2024-05-01 NOTE — MEDICATION SCRIBE - ADMISSION MEDICATION HISTORY
"Medication Scribe Admission Medication History    Admission medication history is complete. The information provided in this note is only as accurate as the sources available at the time of the update.    Information Source(s): Patient via in-person    Pertinent Information: Patient has not taken any medications in a \"few days\" stated Lisinopril and Atorvastatin were Sunday 04/28 and Insulin was Monday 04/29. Has Freestyle sensor but has not changed in \"over 2 weeks\"    Changes made to PTA medication list:  Added: None  Deleted: Lisinopril 10 mg  Changed: None    Allergies reviewed with patient and updates made in EHR: yes    Medication History Completed By: Grace Gamino 5/1/2024 1:44 PM    PTA Med List   Medication Sig Note Last Dose    atorvastatin (LIPITOR) 20 MG tablet Take 1 tablet (20 mg) by mouth daily  4/28/2024 at am    cetirizine (ZYRTEC) 10 MG tablet Take 10 mg by mouth daily as needed  Past Month at prn    Continuous Blood Gluc  (FREESTYLE PAUL 14 DAY READER) ADELAIDE 1 Device 5 times daily Use to monitor blood sugars per manufacture instructions      Continuous Blood Gluc Sensor (FREESTYLE PAUL 14 DAY SENSOR) MISC USE AS DIRECTED 6 TIMES DAILY. CHANGE EVERY 14 DAYS AS DIRECTED 5/1/2024: Has not changed in 14+ Days     Continuous Blood Gluc Sensor (FREESTYLE PAUL 3 SENSOR) MISC 1 each every 14 days      Glucagon 3 MG/DOSE POWD Spray 1 each in nostril as needed  Unknown at prn    insulin lispro (HUMALOG KWIKPEN) 100 UNIT/ML (1 unit dial) KWIKPEN 1 UNIT PER 7 GRAMS AT BREAKFAST, LUNCH, DINNER AND SNACKS. USES UP TO 50 UNITS DAILY  4/29/2024    insulin pen needle (32G X 4 MM) 32G X 4 MM miscellaneous Use 5 pen needles daily or as directed.  Past Week    KETOSTIX test strip by TABLE SOLN route as needed (Patient taking differently: 1 strip by TABLE SOLN route as needed)  Unknown at prn    lisinopril (ZESTRIL) 20 MG tablet Take 1 tablet (20 mg) by mouth daily  4/28/2024 at am    Multiple " Vitamins-Minerals (SUPER THERA CHANO M) TABS Take 1 tablet by mouth daily  Past Month    TRESIBA FLEXTOUCH 100 UNIT/ML pen INJECT 36 UNITS SUBCUTANEOUS DAILY  4/29/2024

## 2024-05-01 NOTE — ED PROVIDER NOTES
History     Chief Complaint   Patient presents with    Blood Sugar Problem     HPI  Owen Solomon is a 28 year old male with history of type 1 diabetes, hyperlipidemia who presents for evaluation of nausea, vomiting, hyperglycemia.  Patient has not been checking his blood glucose or taking his insulin regularly for the past 3 to 4 days.  He developed nausea/vomiting yesterday and feels very dehydrated/fatigued.  Patient otherwise denies any fevers, chills, cough or cold symptoms, chest or abdominal pain, changes in bowel or urinary habits, other complaints today.  Last episode of DKA was a few years ago.    Allergies:  No Known Allergies    Problem List:    Patient Active Problem List    Diagnosis Date Noted    Morbid obesity (H) 09/28/2020     Priority: Medium    Type 1 diabetes mellitus without complication (H) 12/09/2019     Priority: Medium    Hyperlipidemia LDL goal <100 12/09/2019     Priority: Medium        Past Medical History:    Past Medical History:   Diagnosis Date    Type 1 diabetes (H)        Past Surgical History:    No past surgical history on file.    Family History:    Family History   Problem Relation Age of Onset    Thyroid Disease Mother     Diabetes Type 1 Brother     Heart Disease Maternal Grandfather        Social History:  Marital Status:  Single [1]  Social History     Tobacco Use    Smoking status: Never    Smokeless tobacco: Never   Substance Use Topics    Alcohol use: Not Currently        Medications:    atorvastatin (LIPITOR) 20 MG tablet  cetirizine (ZYRTEC) 10 MG tablet  Continuous Blood Gluc  (FREESTYLE PAUL 14 DAY READER) ADELAIDE  Continuous Blood Gluc Sensor (FREESTYLE PAUL 14 DAY SENSOR) MISC  Continuous Blood Gluc Sensor (FREESTYLE PAUL 3 SENSOR) MISC  Glucagon 3 MG/DOSE POWD  insulin degludec (TRESIBA FLEXTOUCH) 100 UNIT/ML pen  insulin lispro (HUMALOG KWIKPEN) 100 UNIT/ML (1 unit dial) KWIKPEN  insulin pen needle (32G X 4 MM) 32G X 4 MM miscellaneous  KETOSTIX  test strip  lisinopril (ZESTRIL) 10 MG tablet  lisinopril (ZESTRIL) 20 MG tablet  Multiple Vitamins-Minerals (SUPER THERA CHANO M) TABS      Review of Systems   All other systems reviewed and are negative.  See HPI.    Physical Exam   BP: (!) 147/92  Pulse: (!) 136  Temp: 97.9  F (36.6  C)  Resp: 20  SpO2: 96 %    Physical Exam  Vitals and nursing note reviewed.   Constitutional:       General: He is in acute distress.      Appearance: Normal appearance. He is not toxic-appearing.      Comments: Patient appears fatigued, very dry mucous membranes, Kussmaul breathing.   HENT:      Head: Atraumatic.      Mouth/Throat:      Mouth: Mucous membranes are dry.      Pharynx: No oropharyngeal exudate or posterior oropharyngeal erythema.   Eyes:      General: No scleral icterus.     Conjunctiva/sclera: Conjunctivae normal.   Cardiovascular:      Rate and Rhythm: Tachycardia present.      Pulses: Normal pulses.      Heart sounds: Normal heart sounds. No murmur heard.  Pulmonary:      Effort: Respiratory distress present.      Breath sounds: Normal breath sounds.      Comments: Kussmaul breathing, otherwise clear to auscultation.  Abdominal:      General: There is no distension.      Palpations: Abdomen is soft.      Tenderness: There is no abdominal tenderness. There is no guarding or rebound.   Musculoskeletal:         General: No deformity.      Cervical back: Normal range of motion and neck supple.   Skin:     General: Skin is warm.      Capillary Refill: Capillary refill takes less than 2 seconds.      Coloration: Skin is not pale.   Neurological:      General: No focal deficit present.      Mental Status: He is alert and oriented to person, place, and time.   Psychiatric:         Mood and Affect: Mood normal.      Comments: Anxious.  Otherwise fully alert, answering questions appropriately.         ED Course     ED Course as of 05/01/24 4473   Wed May 01, 2024   6184 Spoke with Dr. Candelaria, night hospitalist.  She agrees to  accept the patient as a transfer to [facility].  Patient was in agreement with this plan.     Procedures         Critical Care time: 35 minutes.       Results for orders placed or performed during the hospital encounter of 05/01/24 (from the past 24 hour(s))   CBC with platelets differential    Narrative    The following orders were created for panel order CBC with platelets differential.  Procedure                               Abnormality         Status                     ---------                               -----------         ------                     CBC with platelets and d...[[id number]]                      Final result                 Please view results for these tests on the individual orders.   Comprehensive metabolic panel   Result Value Ref Range    Sodium 132 (L) 135 - 145 mmol/L    Potassium 4.9 3.4 - 5.3 mmol/L    Carbon Dioxide (CO2) 8 (LL) 22 - 29 mmol/L    Anion Gap 38 (H) 7 - 15 mmol/L    Urea Nitrogen 14.7 6.0 - 20.0 mg/dL    Creatinine 1.23 (H) 0.67 - 1.17 mg/dL    GFR Estimate 81 >60 mL/min/1.73m2    Calcium 9.6 8.6 - 10.0 mg/dL    Chloride 86 (L) 98 - 107 mmol/L    Glucose 496 (H) 70 - 99 mg/dL    Alkaline Phosphatase 169 (H) 40 - 150 U/L    AST 38 0 - 45 U/L    ALT 83 (H) 0 - 70 U/L    Protein Total 8.2 6.4 - 8.3 g/dL    Albumin 4.6 3.5 - 5.2 g/dL    Bilirubin Total 0.4 <=1.2 mg/dL   Lactic acid whole blood   Result Value Ref Range    Lactic Acid 2.2 (H) 0.7 - 2.0 mmol/L   Magnesium   Result Value Ref Range    Magnesium 2.3 1.7 - 2.3 mg/dL   Blood gas venous   Result Value Ref Range    pH Venous 7.01 (LL) 7.32 - 7.43    pCO2 Venous 28 (L) 40 - 50 mm Hg    pO2 Venous 36 25 - 47 mm Hg    Bicarbonate Venous 7 (LL) 21 - 28 mmol/L    Base Excess/Deficit Venous -22.8 (L) -3.0 - 3.0 mmol/L    FIO2 21     Oxyhemoglobin Venous 54 (L) 70 - 75 %    O2 Sat, Venous 55.4 (L) 70.0 - 75.0 %    Narrative    In healthy individuals, oxyhemoglobin (O2Hb) and oxygen saturation (SO2) are approximately  equal. In the presence of dyshemoglobins, oxyhemoglobin can be considerably lower than oxygen saturation.   Ketone Beta-Hydroxybutyrate Quantitative   Result Value Ref Range    Ketone (Beta-Hydroxybutyrate) Quantitative >9.00 (HH) <=0.30 mmol/L   CBC with platelets and differential   Result Value Ref Range    WBC Count 8.5 4.0 - 11.0 10e3/uL    RBC Count 4.95 4.40 - 5.90 10e6/uL    Hemoglobin 16.0 13.3 - 17.7 g/dL    Hematocrit 47.2 40.0 - 53.0 %    MCV 95 78 - 100 fL    MCH 32.3 26.5 - 33.0 pg    MCHC 33.9 31.5 - 36.5 g/dL    RDW 11.2 10.0 - 15.0 %    Platelet Count 252 150 - 450 10e3/uL    % Neutrophils 52 %    % Lymphocytes 32 %    % Monocytes 11 %    % Eosinophils 3 %    % Basophils 2 %    % Immature Granulocytes 1 %    NRBCs per 100 WBC 0 <1 /100    Absolute Neutrophils 4.4 1.6 - 8.3 10e3/uL    Absolute Lymphocytes 2.7 0.8 - 5.3 10e3/uL    Absolute Monocytes 0.9 0.0 - 1.3 10e3/uL    Absolute Eosinophils 0.3 0.0 - 0.7 10e3/uL    Absolute Basophils 0.2 0.0 - 0.2 10e3/uL    Absolute Immature Granulocytes 0.1 <=0.4 10e3/uL    Absolute NRBCs 0.0 10e3/uL   Extra Tube    Narrative    The following orders were created for panel order Extra Tube.  Procedure                               Abnormality         Status                     ---------                               -----------         ------                     Extra Green Top (Lithium...[[id number]]                      Final result                 Please view results for these tests on the individual orders.   Extra Green Top (Lithium Heparin) Tube   Result Value Ref Range    Hold Specimen JI    TSH with free T4 reflex   Result Value Ref Range    TSH 1.51 0.30 - 4.20 uIU/mL   Ethyl Alcohol Level   Result Value Ref Range    Alcohol ethyl <0.01 <=0.01 g/dL   Phosphorus   Result Value Ref Range    Phosphorus 5.2 (H) 2.5 - 4.5 mg/dL       Medications   ondansetron (ZOFRAN) injection 4 mg (4 mg Intravenous $Given 5/1/24 2815)   sodium chloride 0.9% BOLUS 1,000 mL  (1,000 mLs Intravenous $New Bag 24 0439)   dextrose 50 % injection 25-50 mL (has no administration in time range)   sodium chloride 0.9 % infusion (1,000 mLs Intravenous $New Bag 24 9116)   insulin regular (MYXREDLIN) 1 unit/mL infusion (5.5 Units/hr Intravenous $New Bag 24 4478)   sodium chloride 0.9% BOLUS 1,000 mL (0 mLs Intravenous Stopped 24 2480)       Assessments & Plan (with Medical Decision Making)     I have reviewed the nursing notes.    I have reviewed the findings, diagnosis, plan and need for follow up with the patient.  Medical Decision Making  Owen Solomon is a 28 year old male with history of type 1 diabetes, hyperlipidemia who presents for evaluation of nausea, vomiting, hyperglycemia.  Tachycardic to 136 on arrival, vitals otherwise normal.  Exam is significant for Kussmaul breathing, respiratory distress with otherwise clear lung sounds.  Mucous membranes are very dry, but abdomen is nontender and without rebound/guarding.  Presentation is most concerning for DKA in the setting of poor insulin use and hyperglycemia over the past few days.  Fluids were started along with Zofran on arrival.  Labs showed several abnormalities including glucose level of 196, anion gap of 38, bicarb of 8, minimally elevated alkaline phosphatase and ALT.  Ketones were undetectably high at greater than 9.  VB.01/28/36/7.  A second liter of fluids was administered and insulin drip was started.  Unfortunately our ICU is at capacity and therefore patient will require transfer.  There was an ICU level bed available at [facility] and Dr. Candelaria agreed to accept the patient as a transfer.  Patient is still awaiting transport at shift change.  He will therefore be signed out to my daytime colleague for continued treatment until the time of disposition.    Critical care time: 35 minutes.  Patient was critically ill due to DKA and severe metabolic acidosis.  Care required fluid resuscitation,  initiation of insulin drip, arrangement of transfer.    Patient was tachycardic and had an elevated lactic acid level.  I believe this is due to his DKA and metabolic acidosis and not indicative of sepsis, particularly since he has no leukocytosis.  I do not think blood cultures or antibiotics are warranted at this time.    New Prescriptions    No medications on file     Final diagnoses:   Diabetic ketoacidosis without coma associated with type 1 diabetes mellitus (H)   MONICA (acute kidney injury) (H24)       5/1/2024   [facility] EMERGENCY DEPT       Ciro Ruiz MD  05/01/24 0599

## 2024-05-02 NOTE — PLAN OF CARE
End Of Shift Note    Situation: Sugars and labs improving. Continue insulin drip. Currently 3 unit(s)/hour on algorithm 3. VSS    Plan: Continue Insulin drip.     Subjective/Objective:    Neuro: A/O 4x, makes needs known.     Cardiac: WDL- NSR.    Resp: WDL    GI/: WDL     Endo: WDL    MSK: WDL- independent, calls appropriately.     Skin: WDL    LDAs: WDL.

## 2024-05-02 NOTE — DISCHARGE SUMMARY
"[facility]  Hospitalist Discharge Summary      Date of Admission:  5/1/2024  Date of Discharge:  5/2/2024  Discharging Provider: Raj Yoder DO  Discharge Service: Hospitalist Service    Discharge Diagnoses   Diabetic ketoacidosis without coma associated with type 1 diabetes mellitus   Anion gap metabolic acidosis  MONICA, mild, resolved  Elevated LFTs, resolved  Hyperlipidemia LDL goal <100  Obesity      Hypertension    Clinically Significant Risk Factors     # Obesity: Estimated body mass index is 31.18 kg/m  as calculated from the following:    Height as of this encounter: 1.854 m (6' 1\").    Weight as of this encounter: 107.2 kg (236 lb 5.3 oz).       Follow-ups Needed After Discharge   Follow-up Appointments     Follow-up and recommended labs and tests       Follow up with primary care provider, Liberty King, within 7 days   for hospital follow- up.  No follow up labs or test are needed.            Discharge Disposition   Discharged to home  Condition at discharge: Stable    Hospital Course   Owen Solomon is a 28 year old male with a past medical hx of T1DM, HLD who presents to [facility] from [facility] ER on 5/1/2024 with a diagnosis of DKA.    Diabetic ketoacidosis without coma associated with type 1 diabetes mellitus   Anion gap metabolic acidosis    Pts CGM stopped working 3-4d ago and he stopped taking his insulin. Developed fatigue, n/v, increased thirst, SOB on am of 5/1. Afebrile. Tachycardic to 130s in ER, now improved to 90s. SpO2 high 90s on RA.     Initial glucose 496, now 170-200. Ketones >9 ?  >9. VBG with pH 7.01, CO2 28, bicarb 7. Repeat VBG with pH 7.19, CO2 28, bicarb 11. Anion gap 38 ?  26. Lactate 2.2 ?  1.2. A1c 8.6% on 5/1/24. Phos 5.2 ?  2.7. Alcohol, TSH, WBC, Mg, K WNL. Na initially 132, but normal when corrected for hyperglycemia.     He transferred to [facility] from [facility] due to lack of bed availability. In the ER he received an insulin drip, 2L NS, and " started on NS 125ml/hr. He arrived on 1.5U insulin/hr. Pt feels significantly better on arrival to [facility].  5/2: Patient remained on insulin infusion overnight with resolution of AG and acidosis. Patient's symptoms were relatively resolved yesterday and he felt back to his baseline on the day of discharge. Gave patient 18 U glargine in the morning and started sliding scale correction.   - Instructed patient to take additional 18 U tresiba when he gets home and resume PTA 36 U Tresiba 5/03. Patient will  Freestyle glo sensors on the way home and have his sensor working by late afternoon.   - Continue PTA 1:7 CHO and sliding scale insulin  - Consistent carb diet  - Reached out to patient's endocrine team about having them reach out to patient and setting up an earlier follow-up.   - Follow up with PCP in 1-2 weeks    MONICA, mild, resolved  Cr 1.23 ?  1.00 (baseline 0.73-0.87).     Elevated LFTs, resolved  Alk phos 169 and ALT 83 on presentation, and normalized on arrival to [facility]. Other LFTs WNL. LFTs haven't been previously elevated. Pt endorses drinking alcohol 2-3x/wk and having 4-5 drinks on each occasion. He last drank 4/29 pm. No RUQ pain.    Hyperlipidemia LDL goal <100  Managed prior to admission with Atorvastatin 20 mg, continue.    Obesity  BMI 31.2. Contributes to morbidity and mortality.    Hypertension  Patient previously managed with PTA lisinopril 20 mg. Lisinopril was held on admission given MONICA and dehydration.   - Resume lisinopril on discharge      Consultations This Hospital Stay   None    Code Status   Full Code    Time Spent on this Encounter   IRaj DO, personally saw the patient today and spent greater than 30 minutes discharging this patient.       Raj Yoder DO  [facility] INTENSIVE CARE  [address], [place] [address]  Phone: [phone]  Fax: [phone]  ______________________________________________________________________    Physical Exam    Vital Signs: Temp: 98.5  F (36.9  C) Temp src: Oral BP: (!) 136/96 Pulse: 91   Resp: 20 SpO2: 98 % O2 Device: None (Room air)    Weight: 236 lbs 5.33 oz    Constitutional: awake, alert, cooperative, no apparent distress, and appears stated age  Respiratory: No increased work of breathing, good air exchange, clear to auscultation bilaterally, no crackles or wheezing  Cardiovascular: Normal apical impulse, regular rate and rhythm, normal S1 and S2, no S3 or S4, and no murmur noted  GI: No scars, normal bowel sounds, soft, non-distended, non-tender, no masses palpated, no hepatosplenomegally  Skin: normal skin color, texture, turgor  Musculoskeletal: There is no redness, warmth, or swelling of the joints.  Full range of motion noted.  Motor strength is 5 out of 5 all extremities bilaterally.  Tone is normal.       Primary Care Physician   Liberty King    Discharge Orders      Reason for your hospital stay    DKA, metabolic acidosis, dehydration     Follow-up and recommended labs and tests     Follow up with primary care provider, Liberty King, within 7 days for hospital follow- up.  No follow up labs or test are needed.     Activity    Your activity upon discharge: activity as tolerated     Diet    Follow this diet upon discharge:  Moderate Consistent Carb (60 g CHO per Meal) Diet       Significant Results and Procedures   Most Recent 3 CBC's:  Recent Labs   Lab Test 05/02/24  0602 05/01/24  0359   WBC 3.3* 8.5   HGB 12.6* 16.0   MCV 93 95    252     Most Recent 3 BMP's:  Recent Labs   Lab Test 05/02/24  1242 05/02/24  1016 05/02/24  0912 05/02/24  0606 05/02/24  0602 05/02/24  0205 05/02/24  0159     --   --   --  139  139  --  138   POTASSIUM 4.2  --   --   --  3.2*  3.2*  --  3.7   CHLORIDE 105  --   --   --  109*  109*  --  107   CO2 21*  --   --   --  20*  20*  --  20*   BUN 8.2  --   --   --  8.8  8.8  --  9.8   CR 0.86  --   --   --  0.73  0.73  --  0.91   ANIONGAP 10  --   --   --   10  10  --  11   JOSEF 8.7  --   --   --  8.4*  8.4*  --  8.2*   * 136* 184*   < > 119*  119*   < > 124*    < > = values in this interval not displayed.   , No results found for this or any previous visit.    Discharge Medications   Current Discharge Medication List        CONTINUE these medications which have NOT CHANGED    Details   atorvastatin (LIPITOR) 20 MG tablet Take 1 tablet (20 mg) by mouth daily  Qty: 90 tablet, Refills: 0    Associated Diagnoses: Hyperlipidemia LDL goal <100; Type 1 diabetes mellitus without complication (H)      cetirizine (ZYRTEC) 10 MG tablet Take 10 mg by mouth daily as needed      Continuous Blood Gluc  (FREESTYLE PAUL 14 DAY READER) ADELAIDE 1 Device 5 times daily Use to monitor blood sugars per manufacture instructions  Qty: 1 each, Refills: 0    Associated Diagnoses: Type 1 diabetes mellitus without complication (H)      !! Continuous Blood Gluc Sensor (FREESTYLE PAUL 14 DAY SENSOR) MISC USE AS DIRECTED 6 TIMES DAILY. CHANGE EVERY 14 DAYS AS DIRECTED  Qty: 6 each, Refills: 1    Comments: Must keep 6/15 follow up appt for additional refills  Associated Diagnoses: Type 1 diabetes mellitus without complication (H)      !! Continuous Blood Gluc Sensor (FREESTYLE PAUL 3 SENSOR) MISC 1 each every 14 days  Qty: 6 each, Refills: 1    Associated Diagnoses: Type 1 diabetes mellitus without complication (H)      Glucagon 3 MG/DOSE POWD Spray 1 each in nostril as needed  Qty: 1 each, Refills: 0    Comments: Please keep scheduled appointment on 3/9/20. Thank you.  Associated Diagnoses: Type 1 diabetes mellitus (H)      insulin lispro (HUMALOG KWIKPEN) 100 UNIT/ML (1 unit dial) KWIKPEN 1 UNIT PER 7 GRAMS AT BREAKFAST, LUNCH, DINNER AND SNACKS. USES UP TO 50 UNITS DAILY  Qty: 60 mL, Refills: 1    Associated Diagnoses: Type 1 diabetes mellitus without complication (H)      insulin pen needle (32G X 4 MM) 32G X 4 MM miscellaneous Use 5 pen needles daily or as directed.  Qty: 450  each, Refills: 11    Associated Diagnoses: Type 1 diabetes mellitus without complication (H)      KETOSTIX test strip by TABLE SOLN route as needed  Qty: 25 each, Refills: 0    Associated Diagnoses: Type 1 diabetes mellitus (H)      lisinopril (ZESTRIL) 20 MG tablet Take 1 tablet (20 mg) by mouth daily  Qty: 90 tablet, Refills: 1    Associated Diagnoses: Type 1 diabetes mellitus without complication (H); Benign essential hypertension      Multiple Vitamins-Minerals (SUPER THERA CHANO M) TABS Take 1 tablet by mouth daily      TRESIBA FLEXTOUCH 100 UNIT/ML pen INJECT 36 UNITS SUBCUTANEOUS DAILY  Qty: 30 mL, Refills: 0    Comments: DX Code Needed  PT NEEDS NEW RX FOR A COMPLETE FILL. PLEASE SEND, THANKS.  Associated Diagnoses: Type 1 diabetes mellitus without complication (H)       !! - Potential duplicate medications found. Please discuss with provider.        Allergies   No Known Allergies

## 2024-05-02 NOTE — PROGRESS NOTES
[place] [facility] DISCHARGE NOTE    Patient discharged to home at 3:05 PM via ambulation. Accompanied by other:friend and staff. Discharge instructions reviewed with patient, opportunity offered to ask questions. Prescriptions - None ordered for discharge. All belongings sent with patient.  Pt discharged in stable condition - states he will  home diabetic meds & supplies on way home.    Marivel Ojeda RN

## 2024-05-07 NOTE — TELEPHONE ENCOUNTER
HUMALOG 100 UNIT/ML KWIKPEN      Last Written Prescription Date:  6/15/23  Last Fill Quantity: 60 ml ,   # refills: 1  Last Office Visit : 9/15/23  Future Office visit:  9/6/24 [facility] endo    Routing refill request to provider for review/approval because:  Insulin and insulin pump supplies - refilled per Endocrine clinic.      05/02/24 (!) 136/96   05/01/24 118/70   09/15/23 (!) 157/123   Lisinopril:  Can refill x 3 months if BP greater than or equal to 140/90, and refer to MD  for follow up.

## 2024-09-22 ENCOUNTER — HEALTH MAINTENANCE LETTER (OUTPATIENT)
Age: 29
End: 2024-09-22